# Patient Record
Sex: MALE | Race: WHITE | NOT HISPANIC OR LATINO | Employment: FULL TIME | URBAN - METROPOLITAN AREA
[De-identification: names, ages, dates, MRNs, and addresses within clinical notes are randomized per-mention and may not be internally consistent; named-entity substitution may affect disease eponyms.]

---

## 2017-01-09 RX ORDER — ALLOPURINOL 300 MG/1
TABLET ORAL
Qty: 90 TABLET | Refills: 0 | Status: SHIPPED | OUTPATIENT
Start: 2017-01-09 | End: 2017-04-24 | Stop reason: SDUPTHER

## 2017-01-16 RX ORDER — OLMESARTAN MEDOXOMIL 20 MG/1
TABLET, FILM COATED ORAL
Qty: 90 TABLET | Refills: 3 | Status: SHIPPED | OUTPATIENT
Start: 2017-01-16 | End: 2017-02-13 | Stop reason: DRUGHIGH

## 2017-02-13 ENCOUNTER — TELEPHONE (OUTPATIENT)
Dept: SPORTS MEDICINE | Facility: CLINIC | Age: 52
End: 2017-02-13

## 2017-02-13 DIAGNOSIS — I10 ESSENTIAL HYPERTENSION: Primary | ICD-10-CM

## 2017-02-13 RX ORDER — OLMESARTAN MEDOXOMIL 40 MG/1
40 TABLET ORAL DAILY
Qty: 30 TABLET | Refills: 3 | Status: SHIPPED | OUTPATIENT
Start: 2017-02-13 | End: 2017-04-24

## 2017-04-24 ENCOUNTER — OFFICE VISIT (OUTPATIENT)
Dept: SPORTS MEDICINE | Facility: CLINIC | Age: 52
End: 2017-04-24

## 2017-04-24 VITALS
BODY MASS INDEX: 31.08 KG/M2 | WEIGHT: 222 LBS | TEMPERATURE: 98 F | HEART RATE: 57 BPM | DIASTOLIC BLOOD PRESSURE: 96 MMHG | OXYGEN SATURATION: 98 % | HEIGHT: 71 IN | SYSTOLIC BLOOD PRESSURE: 140 MMHG

## 2017-04-24 DIAGNOSIS — I10 ESSENTIAL HYPERTENSION: ICD-10-CM

## 2017-04-24 DIAGNOSIS — R06.83 SNORING: ICD-10-CM

## 2017-04-24 DIAGNOSIS — Z11.59 NEED FOR HEPATITIS C SCREENING TEST: ICD-10-CM

## 2017-04-24 DIAGNOSIS — N50.819 TESTICULAR DISCOMFORT: ICD-10-CM

## 2017-04-24 DIAGNOSIS — K42.9 UMBILICAL HERNIA WITHOUT OBSTRUCTION AND WITHOUT GANGRENE: ICD-10-CM

## 2017-04-24 DIAGNOSIS — Z00.00 PREVENTATIVE HEALTH CARE: Primary | ICD-10-CM

## 2017-04-24 DIAGNOSIS — L82.1 SEBORRHEIC KERATOSIS: ICD-10-CM

## 2017-04-24 DIAGNOSIS — E79.0 HYPERURICEMIA: ICD-10-CM

## 2017-04-24 DIAGNOSIS — M72.2 PLANTAR FASCIITIS, LEFT: ICD-10-CM

## 2017-04-24 PROBLEM — M10.9 GOUT: Status: ACTIVE | Noted: 2017-04-24

## 2017-04-24 PROCEDURE — 90715 TDAP VACCINE 7 YRS/> IM: CPT | Performed by: FAMILY MEDICINE

## 2017-04-24 PROCEDURE — 99213 OFFICE O/P EST LOW 20 MIN: CPT | Performed by: FAMILY MEDICINE

## 2017-04-24 PROCEDURE — 90471 IMMUNIZATION ADMIN: CPT | Performed by: FAMILY MEDICINE

## 2017-04-24 PROCEDURE — 99396 PREV VISIT EST AGE 40-64: CPT | Performed by: FAMILY MEDICINE

## 2017-04-24 RX ORDER — BENAZEPRIL HYDROCHLORIDE 20 MG/1
20 TABLET ORAL DAILY
Qty: 90 TABLET | Refills: 3 | Status: SHIPPED | OUTPATIENT
Start: 2017-04-24 | End: 2017-07-07

## 2017-04-24 RX ORDER — ALLOPURINOL 300 MG/1
300 TABLET ORAL DAILY
Qty: 90 TABLET | Refills: 3 | Status: SHIPPED | OUTPATIENT
Start: 2017-04-24 | End: 2018-04-08 | Stop reason: SDUPTHER

## 2017-04-24 RX ORDER — OLMESARTAN MEDOXOMIL 40 MG/1
40 TABLET ORAL DAILY
Qty: 90 TABLET | Refills: 3 | Status: CANCELLED | OUTPATIENT
Start: 2017-04-24

## 2017-04-24 NOTE — PROGRESS NOTES
"Subjective   Evan Carballo is a 52 y.o. male.   Chief Complaint   Patient presents with   • Annual Exam       History of Present Illness   1.  CPE  2.  FU HTN, has been elevated over past few months even with increase in Benicar dose  3.  FU gout, no acute attacks in over a year  4.  R testicular pain past month, is intermittent, \"like I got hit in the nuts\". Brief in duration. No palpable areas of pain or \"lumps\" on SRINI.   5.  Check spot on back, wife says can get dry and flaky at times  6.  Hearing, [place], \"wife says I dont hear well\". Has had years of exposure to guns.   7.  Snoring and possible apnea per wife.   8.  Intermittent L heel pain, can be worse in AM, better after stretches.       I have reviewed the patient's medical history in detail and updated the computerized patient record.    Review of Systems   Constitutional: Negative.    HENT: Negative.    Eyes: Negative.    Respiratory: Negative.    Cardiovascular: Negative.    Gastrointestinal: Negative.    Genitourinary:        Per history of present illness   Musculoskeletal:        Per history of present illness   Skin:        Per history of present illness   Neurological: Negative.    Hematological: Negative.    Psychiatric/Behavioral: Negative.      /96  Pulse 57  Temp 98 °F (36.7 °C)  Ht 71\" (180.3 cm)  Wt 222 lb (101 kg)  SpO2 98%  BMI 30.96 kg/m2    Objective   Physical Exam   Constitutional: He is oriented to person, place, and time. He appears well-developed and well-nourished.   HENT:   Head: Normocephalic and atraumatic.   Right Ear: External ear normal.   Left Ear: External ear normal.   Nose: Nose normal.   Mouth/Throat: Oropharynx is clear and moist.   Eyes: Conjunctivae and EOM are normal. Pupils are equal, round, and reactive to light.   Neck: Normal range of motion. Neck supple. No thyromegaly present.   Cardiovascular: Normal rate, regular rhythm and normal heart sounds.    No peripheral edema   Pulmonary/Chest: Effort normal " and breath sounds normal.   Abdominal: Soft. Bowel sounds are normal.   Small reducible umbilical hernia to the left.   Genitourinary:   Genitourinary Comments: Bilateral testicular exam normal, there are no areas of tenderness over the epididymis.  No palpable masses.  There is no evidence of direct or indirect hernia.  No pain during exam.   Musculoskeletal:   Left foot and ankle normal in general appearance, motor 5 out of 5, neurovascularly intact.  Patient has tenderness at the plantar fascia origin on the medial calcaneus.  Negative Tinel's.   Neurological: He is alert and oriented to person, place, and time.   Skin: Skin is warm, dry and intact.   Area of concern on his back there is a 3 mm seborrheic keratosis   Psychiatric: He has a normal mood and affect. His behavior is normal. Thought content normal.   Vitals reviewed.      Assessment/Plan   Evan was seen today for annual exam.    Diagnoses and all orders for this visit:    Preventative health care  -     Ambulatory Referral For Screening Colonoscopy  -     Tdap Vaccine Greater Than or Equal To 6yo IM  -     CBC & Differential  -     Comprehensive Metabolic Panel  -     Lipid Panel With / Chol / HDL Ratio  -     PSA  -     T4, Free  -     TSH  -     UA / M With / Rflx Culture([facility] ONLY)    Essential hypertension    Hyperuricemia  -     Uric Acid    Need for hepatitis C screening test    Snoring  -     Ambulatory Referral to Sleep Medicine    Umbilical hernia without obstruction and without gangrene    Plantar fasciitis, left    Seborrheic keratosis    Testicular discomfort    Other orders  -     allopurinol (ZYLOPRIM) 300 MG tablet; Take 1 tablet by mouth Daily.  -     Cancel: olmesartan (BENICAR) 40 MG tablet; Take 1 tablet by mouth Daily.  -     benazepril (LOTENSIN) 20 MG tablet; Take 1 tablet by mouth Daily.      1.  Hypertension, will change medication to benazepril 20 mg daily, follow-up blood pressure 4 weeks with a BMP.  2.  Left plantar  fasciitis, continue heel cups and at home exercise program.  3.  Right testicular discomfort, is intermittent, normal exam, we'll recommend extra support undergarments but if there's no improvement over the next 4 weeks and refer to urologist.  4.  Snoring with possible witnessed apnea, senna for sleep study.  5.  Umbilical hernia, asymptomatic and reducible, we'll follow-up on one year but also gave the patient information of signs and symptoms when he needs to go to the emergency room.  He prefer to observe rather than to refer to general surgery at this time which I think is appropriate since he is asymptomatic and it is reducible.  6.  Decreased hearing, patient prefers to observe.  It is most likely secondary to repeated exposure to gun reports.  7.  Set up for colonoscopy.  8.  Seborrheic keratosis, observe.    EMR Dragon/Transcription disclaimer:    Much of this encounter note is an electronic transcription/translation of spoken language to printed text.  The electronic translation of spoken language may permit erroneous, or at times, nonsensical words or phrases to be inadvertently transcribed.  Although I have reviewed the note for such errors some may still exist.

## 2017-04-25 LAB
ALBUMIN SERPL-MCNC: 4.4 G/DL (ref 3.5–5.2)
ALBUMIN/GLOB SERPL: 1.6 G/DL
ALP SERPL-CCNC: 64 U/L (ref 39–117)
ALT SERPL-CCNC: 43 U/L (ref 1–41)
APPEARANCE UR: CLEAR
AST SERPL-CCNC: 26 U/L (ref 1–40)
BACTERIA #/AREA URNS HPF: NORMAL /HPF
BASOPHILS # BLD AUTO: 0.07 10*3/MM3 (ref 0–0.2)
BASOPHILS NFR BLD AUTO: 1 % (ref 0–1.5)
BILIRUB SERPL-MCNC: 0.5 MG/DL (ref 0.1–1.2)
BILIRUB UR QL STRIP: NEGATIVE
BUN SERPL-MCNC: 15 MG/DL (ref 6–20)
BUN/CREAT SERPL: 14.4 (ref 7–25)
CALCIUM SERPL-MCNC: 10 MG/DL (ref 8.6–10.5)
CHLORIDE SERPL-SCNC: 103 MMOL/L (ref 98–107)
CHOLEST SERPL-MCNC: 148 MG/DL (ref 0–200)
CHOLEST/HDLC SERPL: 3.89 {RATIO}
CO2 SERPL-SCNC: 26.4 MMOL/L (ref 22–29)
COLOR UR: YELLOW
CREAT SERPL-MCNC: 1.04 MG/DL (ref 0.76–1.27)
EOSINOPHIL # BLD AUTO: 0.13 10*3/MM3 (ref 0–0.7)
EOSINOPHIL NFR BLD AUTO: 1.8 % (ref 0.3–6.2)
EPI CELLS #/AREA URNS HPF: NORMAL /HPF
ERYTHROCYTE [DISTWIDTH] IN BLOOD BY AUTOMATED COUNT: 13.8 % (ref 11.5–14.5)
GLOBULIN SER CALC-MCNC: 2.8 GM/DL
GLUCOSE SERPL-MCNC: 83 MG/DL (ref 65–99)
GLUCOSE UR QL: NEGATIVE
HCT VFR BLD AUTO: 46.8 % (ref 40.4–52.2)
HDLC SERPL-MCNC: 38 MG/DL (ref 40–60)
HGB BLD-MCNC: 15.6 G/DL (ref 13.7–17.6)
HGB UR QL STRIP: NEGATIVE
IMM GRANULOCYTES # BLD: 0 10*3/MM3 (ref 0–0.03)
IMM GRANULOCYTES NFR BLD: 0 % (ref 0–0.5)
KETONES UR QL STRIP: NEGATIVE
LDLC SERPL CALC-MCNC: 80 MG/DL (ref 0–100)
LEUKOCYTE ESTERASE UR QL STRIP: NEGATIVE
LYMPHOCYTES # BLD AUTO: 1.61 10*3/MM3 (ref 0.9–4.8)
LYMPHOCYTES NFR BLD AUTO: 22.3 % (ref 19.6–45.3)
MCH RBC QN AUTO: 31.5 PG (ref 27–32.7)
MCHC RBC AUTO-ENTMCNC: 33.3 G/DL (ref 32.6–36.4)
MCV RBC AUTO: 94.4 FL (ref 79.8–96.2)
MICRO URNS: NORMAL
MICRO URNS: NORMAL
MONOCYTES # BLD AUTO: 0.95 10*3/MM3 (ref 0.2–1.2)
MONOCYTES NFR BLD AUTO: 13.1 % (ref 5–12)
NEUTROPHILS # BLD AUTO: 4.47 10*3/MM3 (ref 1.9–8.1)
NEUTROPHILS NFR BLD AUTO: 61.8 % (ref 42.7–76)
NITRITE UR QL STRIP: NEGATIVE
PH UR STRIP: 6 [PH] (ref 5–7.5)
PLATELET # BLD AUTO: 344 10*3/MM3 (ref 140–500)
POTASSIUM SERPL-SCNC: 5.1 MMOL/L (ref 3.5–5.2)
PROT SERPL-MCNC: 7.2 G/DL (ref 6–8.5)
PROT UR QL STRIP: NEGATIVE
PSA SERPL-MCNC: 0.67 NG/ML (ref 0–4)
RBC # BLD AUTO: 4.96 10*6/MM3 (ref 4.6–6)
RBC #/AREA URNS HPF: NORMAL /HPF
SODIUM SERPL-SCNC: 143 MMOL/L (ref 136–145)
SP GR UR: 1.02 (ref 1–1.03)
T4 FREE SERPL-MCNC: 1.14 NG/DL (ref 0.93–1.7)
TRIGL SERPL-MCNC: 148 MG/DL (ref 0–150)
TSH SERPL DL<=0.005 MIU/L-ACNC: 2.07 MIU/ML (ref 0.27–4.2)
URATE SERPL-MCNC: 5.5 MG/DL (ref 3.4–7)
URINALYSIS REFLEX: NORMAL
UROBILINOGEN UR STRIP-MCNC: 0.2 MG/DL (ref 0.2–1)
VLDLC SERPL CALC-MCNC: 29.6 MG/DL (ref 5–40)
WBC # BLD AUTO: 7.23 10*3/MM3 (ref 4.5–10.7)
WBC #/AREA URNS HPF: NORMAL /HPF

## 2017-06-20 ENCOUNTER — HOSPITAL ENCOUNTER (OUTPATIENT)
Dept: SLEEP MEDICINE | Facility: HOSPITAL | Age: 52
Discharge: HOME | End: 2017-06-20
Admitting: FAMILY MEDICINE

## 2017-06-20 DIAGNOSIS — R06.83 SNORING: ICD-10-CM

## 2017-06-20 DIAGNOSIS — G47.33 OBSTRUCTIVE SLEEP APNEA: Primary | ICD-10-CM

## 2017-06-20 PROCEDURE — G0463 HOSPITAL OUTPT CLINIC VISIT: HCPCS

## 2017-06-20 NOTE — PROGRESS NOTES
DATE OF CONSULTATION:  06/20/2017     REFERRING PHYSICIAN:  Reji Andrade MD    REASON FOR CONSULTATION:  Obstructive sleep apnea syndrome.     HISTORY OF PRESENT ILLNESS:  The patient is a 52-year-old male who has a history of hypertension, and complains of excessive daytime sleepiness and fatigue.     His wife has noted him to snore loud in all sleeping positions, and he is awakened with coughing, choking, respiratory discomfort, and dry mouth. No witnessed sleep apnea. At night he has difficulty staying asleep, with frequent awakenings and wakes up whenever he turns over.     His sleep schedule. Bedtime 10:30 p.m. to 7 a.m., sleep latency about 5-30 minutes, when he wakes up feels okay, and he sleeps for about 7 hours daily.     His [place] Sleepiness Scale score is 4 today.     PAST MEDICAL HISTORY:   1.  Hypertension.   2.  Gout.     CURRENT MEDICATIONS:  1.  Allopurinol.   2.  Benazepril.    ALLERGIES:  No medication allergies.     FAMILY HISTORY:  Positive for heart disease.     SOCIAL HISTORY:  He is in law enforcement. No tobacco or alcohol abuse. Caffeine intake minimal.     REVIEW OF SYSTEMS:  Ten system review significant for nasal congestion and fatigue.     PHYSICAL EXAMINATION:  VITAL SIGNS: Weight 226 pounds, height 71.5 inches, body mass index 31, neck collar size 16 inches, blood pressure 152/92, heart rate 56.   HEENT: Narrow oropharynx. Mallampati class III.  NECK: Supple. No bruits.   CARDIAC: Normal.   LUNGS: Clear to auscultation.   EXTREMITIES: No edema.     IMPRESSION:  Patient with hypertension, with chronic fatigue, loud snoring, awakens coughing, choking, and gasping for breath, and probably has obstructive sleep apnea syndrome.     I will proceed with polysomnography. Treat with CPAP therapy if needed. Follow up following the polysomnography.        STAN Enrique:ms  D:   06/20/2017 11:23:08  T:   06/20/2017 17:46:56  Job ID:   [id number]  Document ID:    [id number]  cc:

## 2017-07-07 ENCOUNTER — TELEPHONE (OUTPATIENT)
Dept: SPORTS MEDICINE | Facility: CLINIC | Age: 52
End: 2017-07-07

## 2017-07-07 RX ORDER — AMLODIPINE BESYLATE 5 MG/1
5 TABLET ORAL DAILY
Qty: 30 TABLET | Refills: 2 | Status: SHIPPED | OUTPATIENT
Start: 2017-07-07 | End: 2017-07-19 | Stop reason: ALTCHOICE

## 2017-07-19 ENCOUNTER — OFFICE VISIT (OUTPATIENT)
Dept: SPORTS MEDICINE | Facility: CLINIC | Age: 52
End: 2017-07-19

## 2017-07-19 VITALS
OXYGEN SATURATION: 98 % | SYSTOLIC BLOOD PRESSURE: 128 MMHG | WEIGHT: 223 LBS | DIASTOLIC BLOOD PRESSURE: 92 MMHG | HEIGHT: 71 IN | HEART RATE: 67 BPM | BODY MASS INDEX: 31.22 KG/M2

## 2017-07-19 DIAGNOSIS — I10 ESSENTIAL HYPERTENSION: Primary | ICD-10-CM

## 2017-07-19 PROCEDURE — 99213 OFFICE O/P EST LOW 20 MIN: CPT | Performed by: FAMILY MEDICINE

## 2017-07-19 RX ORDER — AMLODIPINE AND OLMESARTAN MEDOXOMIL 5; 20 MG/1; MG/1
1 TABLET ORAL DAILY
Qty: 30 TABLET | Refills: 11 | Status: SHIPPED | OUTPATIENT
Start: 2017-07-19 | End: 2017-08-09

## 2017-07-19 RX ORDER — AMLODIPINE AND OLMESARTAN MEDOXOMIL 5; 40 MG/1; MG/1
1 TABLET ORAL DAILY
Qty: 30 TABLET | Refills: 11 | Status: SHIPPED | OUTPATIENT
Start: 2017-07-19 | End: 2017-07-19 | Stop reason: ALTCHOICE

## 2017-07-19 NOTE — PROGRESS NOTES
"Subjective   Evan Carballo is a 52 y.o. male.   Chief Complaint   Patient presents with   • Hypertension       History of Present Illness   1.  Patient here to follow-up on hypertension.  He was originally on Benicar 20 mg a day which was controlling his blood pressure very well for a number of years however earlier this year his blood pressure was not well-controlled and we increased his Benicar to 40 mg a day.  Unfortunately that was not helpful as well and we therefore started benazepril 20 mg daily.  He then developed a dry cough and his medication was changed to amlodipine 5 mg daily.  His cough has resolved, he has no adverse effects from amlodipine every has noted occasional puffiness and feet and ankles at the end of the day.  No chest pain, shortness of breath, syncope or presyncope.          Review of Systems   Constitutional: Negative.    HENT: Negative.    Respiratory: Negative.    Cardiovascular: Positive for leg swelling. Negative for chest pain and palpitations.   Gastrointestinal: Negative.    Genitourinary: Negative.    Neurological: Negative.    /92  Pulse 67  Ht 71\" (180.3 cm)  Wt 223 lb (101 kg)  SpO2 98%  BMI 31.1 kg/m2      Objective   Physical Exam   Constitutional: He is oriented to person, place, and time. He appears well-developed and well-nourished.   HENT:   Head: Normocephalic and atraumatic.   Eyes: Conjunctivae are normal. No scleral icterus.   Neck: Neck supple.   Cardiovascular: Normal rate, regular rhythm and normal heart sounds.    Trace pedal edema   Pulmonary/Chest: Effort normal and breath sounds normal.   Lymphadenopathy:     He has no cervical adenopathy.   Neurological: He is alert and oriented to person, place, and time.   Skin: Skin is warm and dry.   Vitals reviewed.      Assessment/Plan   Evan was seen today for hypertension.    Diagnoses and all orders for this visit:    Essential hypertension  -     Discontinue: amlodipine-olmesartan (OTTO) 5-40 MG per " tablet; Take 1 tablet by mouth Daily.  -     amlodipine-olmesartan (OTTO) 5-20 MG per tablet; Take 1 tablet by mouth Daily.      Blood pressure not as well-controlled as I would like, will change medication to Otto 5-20 one by mouth daily and follow-up 3 weeks with BP check.    EMR Dragon/Transcription disclaimer:    Much of this encounter note is an electronic transcription/translation of spoken language to printed text.  The electronic translation of spoken language may permit erroneous, or at times, nonsensical words or phrases to be inadvertently transcribed.  Although I have reviewed the note for such errors some may still exist.

## 2017-08-09 ENCOUNTER — OFFICE VISIT (OUTPATIENT)
Dept: SPORTS MEDICINE | Facility: CLINIC | Age: 52
End: 2017-08-09

## 2017-08-09 VITALS
WEIGHT: 223 LBS | DIASTOLIC BLOOD PRESSURE: 72 MMHG | SYSTOLIC BLOOD PRESSURE: 118 MMHG | OXYGEN SATURATION: 98 % | HEIGHT: 71 IN | HEART RATE: 58 BPM | BODY MASS INDEX: 31.22 KG/M2

## 2017-08-09 DIAGNOSIS — I10 ESSENTIAL HYPERTENSION: Primary | ICD-10-CM

## 2017-08-09 DIAGNOSIS — L82.1 SEBORRHEIC KERATOSES: ICD-10-CM

## 2017-08-09 PROCEDURE — 99213 OFFICE O/P EST LOW 20 MIN: CPT | Performed by: FAMILY MEDICINE

## 2017-08-09 RX ORDER — AMLODIPINE AND OLMESARTAN MEDOXOMIL 5; 20 MG/1; MG/1
1 TABLET ORAL DAILY
COMMUNITY
End: 2018-09-20 | Stop reason: SDUPTHER

## 2017-08-09 RX ORDER — BENAZEPRIL HYDROCHLORIDE 20 MG/1
TABLET ORAL
COMMUNITY
Start: 2017-08-07 | End: 2017-08-09 | Stop reason: ALTCHOICE

## 2017-08-09 NOTE — PROGRESS NOTES
"Evan is a 52 y.o. year old male    No chief complaint on file.      History of Present Illness   HPI   1.  We d/c ACEI due to cough. Cough has resolved and no c/o with Otto.  2.  Check spot on  R leg. Noted over past few weeks. No bleeding or scabbing.       I have reviewed the patient's medical, family, and social history in detail and updated the computerized patient record.    Review of Systems   Constitutional: Negative.    HENT: Negative.    Respiratory: Negative.    Cardiovascular: Negative.    Skin:        Per HPI       /72  Pulse 58  Ht 71\" (180.3 cm)  Wt 223 lb (101 kg)  SpO2 98%  BMI 31.1 kg/m2     Physical Exam   Constitutional: He is oriented to person, place, and time. He appears well-developed and well-nourished.   HENT:   Head: Normocephalic and atraumatic.   Eyes: Conjunctivae and EOM are normal. Pupils are equal, round, and reactive to light.   Cardiovascular: Normal rate, regular rhythm and normal heart sounds.    No peripheral edema   Pulmonary/Chest: Effort normal.   Neurological: He is alert and oriented to person, place, and time.   Skin: Skin is warm and dry.   Tan, slightly scaly lesion R proximal post/lateral calf. Image has been saved to the media section of chart   Psychiatric: He has a normal mood and affect. His behavior is normal.   Vitals reviewed.       Diagnoses and all orders for this visit:    Essential hypertension    Seborrheic keratoses    Other orders  -     Discontinue: benazepril (LOTENSIN) 20 MG tablet;   -     amlodipine-olmesartan (OTTO) 5-20 MG per tablet; Take 1 tablet by mouth Daily.    FU at next CPE or sooner if needed  SK- observe.   "

## 2017-08-10 ENCOUNTER — HOSPITAL ENCOUNTER (OUTPATIENT)
Dept: SLEEP MEDICINE | Facility: HOSPITAL | Age: 52
Discharge: HOME | End: 2017-08-10
Attending: PSYCHIATRY & NEUROLOGY | Admitting: PSYCHIATRY & NEUROLOGY

## 2017-08-10 DIAGNOSIS — G47.33 OBSTRUCTIVE SLEEP APNEA: ICD-10-CM

## 2017-08-10 PROCEDURE — 95806 SLEEP STUDY UNATT&RESP EFFT: CPT

## 2017-08-17 ENCOUNTER — TELEPHONE (OUTPATIENT)
Dept: SLEEP MEDICINE | Facility: HOSPITAL | Age: 52
End: 2017-08-17

## 2017-08-29 ENCOUNTER — APPOINTMENT (OUTPATIENT)
Dept: SLEEP MEDICINE | Facility: HOSPITAL | Age: 52
End: 2017-08-29

## 2018-04-09 RX ORDER — ALLOPURINOL 300 MG/1
TABLET ORAL
Qty: 90 TABLET | Refills: 3 | Status: SHIPPED | OUTPATIENT
Start: 2018-04-09 | End: 2019-03-22 | Stop reason: SDUPTHER

## 2018-07-05 DIAGNOSIS — I10 ESSENTIAL HYPERTENSION: ICD-10-CM

## 2018-07-05 RX ORDER — AMLODIPINE AND OLMESARTAN MEDOXOMIL 5; 20 MG/1; MG/1
1 TABLET ORAL DAILY
Qty: 30 TABLET | Refills: 11 | Status: SHIPPED | OUTPATIENT
Start: 2018-07-05 | End: 2019-06-08 | Stop reason: SDUPTHER

## 2018-09-20 ENCOUNTER — OFFICE VISIT (OUTPATIENT)
Dept: SPORTS MEDICINE | Facility: CLINIC | Age: 53
End: 2018-09-20

## 2018-09-20 VITALS
WEIGHT: 223.2 LBS | HEIGHT: 71 IN | SYSTOLIC BLOOD PRESSURE: 122 MMHG | OXYGEN SATURATION: 97 % | BODY MASS INDEX: 31.25 KG/M2 | DIASTOLIC BLOOD PRESSURE: 88 MMHG | TEMPERATURE: 98.2 F | HEART RATE: 53 BPM

## 2018-09-20 DIAGNOSIS — Z12.11 SCREEN FOR COLON CANCER: ICD-10-CM

## 2018-09-20 DIAGNOSIS — Z00.00 PREVENTATIVE HEALTH CARE: Primary | ICD-10-CM

## 2018-09-20 DIAGNOSIS — L98.9 LEG SKIN LESION, RIGHT: ICD-10-CM

## 2018-09-20 DIAGNOSIS — I10 ESSENTIAL HYPERTENSION: ICD-10-CM

## 2018-09-20 DIAGNOSIS — E79.0 HYPERURICEMIA: ICD-10-CM

## 2018-09-20 PROCEDURE — 99396 PREV VISIT EST AGE 40-64: CPT | Performed by: FAMILY MEDICINE

## 2018-09-20 NOTE — PROGRESS NOTES
"Evan Carballo is here today for an annual physical exam.     - No c/o today    I have reviewed the patient's medical, family, and social history in detail and updated the computerized patient record.    Screening history:  Colonoscopy - due  Prostate - 2017  Metabolic - last year    Health Maintenance   Topic Date Due   • ZOSTER VACCINE (1 of 2) 02/22/2014   • HEPATITIS C SCREENING  04/24/2017   • COLONOSCOPY  04/24/2017   • ANNUAL PHYSICAL  04/25/2018   • INFLUENZA VACCINE  08/01/2018   • TDAP/TD VACCINES (2 - Td) 04/24/2027       Review of Systems   Constitutional: Negative for activity change, appetite change, chills, diaphoresis, fatigue, fever and unexpected weight change.   HENT: Negative for congestion, ear pain, postnasal drip, rhinorrhea, sinus pressure, sneezing, sore throat and trouble swallowing.    Eyes: Negative for visual disturbance.   Respiratory: Negative for cough, chest tightness, shortness of breath and wheezing.    Cardiovascular: Negative for chest pain and palpitations.   Gastrointestinal: Negative for abdominal pain, blood in stool, nausea and vomiting.   Endocrine: Negative for cold intolerance, polydipsia, polyphagia and polyuria.   Genitourinary: Negative for dysuria, flank pain, frequency, hematuria and urgency.   Musculoskeletal: Negative for arthralgias, back pain, joint swelling and myalgias.   Skin: Positive for rash.        Patient continues to have recurring issue of a very pruritic rash right anterior tibia.  He has tried numerous corticosteroid creams but \"it keeps coming back\".   Allergic/Immunologic: Negative for environmental allergies.   Neurological: Negative for dizziness, syncope, weakness, numbness and headaches.   Hematological: Negative for adenopathy. Does not bruise/bleed easily.   Psychiatric/Behavioral: Negative for agitation, decreased concentration, dysphoric mood, sleep disturbance and suicidal ideas. The patient is not nervous/anxious.        /88 (BP " "Location: Left arm, Patient Position: Sitting, Cuff Size: Large Adult)   Pulse 53   Temp 98.2 °F (36.8 °C) (Oral)   Ht 180.3 cm (70.98\")   Wt 101 kg (223 lb 3.2 oz)   SpO2 97%   BMI 31.14 kg/m²      Physical Exam    Vital signs reviewed.  General appearance: No acute distress  Eyes: conjunctiva clear without erythema; pupils equally round and reactive  ENT: external ears and nose normal; hearing normal, oropharynx clear  Neck: supple; no thyromegaly  CV: normal rate and rhythm; no peripheral edema  Respiratory: normal respiratory effort; lungs clear to auscultation bilaterally  MSK: normal gait and station; no focal joint deformity or swelling  Skin: Flat eczematous area over right anterior distal tibia rash or wounds; normal turgor  Neuro: cranial nerves 2-12 grossly intact; normal sensation to light touch  Psych: mood and affect normal; recent and remote memory intact    No visits with results within 2 Week(s) from this visit.   Latest known visit with results is:   Office Visit on 04/24/2017   Component Date Value Ref Range Status   • WBC 04/24/2017 7.23  4.50 - 10.70 10*3/mm3 Final   • RBC 04/24/2017 4.96  4.60 - 6.00 10*6/mm3 Final   • Hemoglobin 04/24/2017 15.6  13.7 - 17.6 g/dL Final   • Hematocrit 04/24/2017 46.8  40.4 - 52.2 % Final   • MCV 04/24/2017 94.4  79.8 - 96.2 fL Final   • MCH 04/24/2017 31.5  27.0 - 32.7 pg Final   • MCHC 04/24/2017 33.3  32.6 - 36.4 g/dL Final   • RDW 04/24/2017 13.8  11.5 - 14.5 % Final   • Platelets 04/24/2017 344  140 - 500 10*3/mm3 Final   • Neutrophil Rel % 04/24/2017 61.8  42.7 - 76.0 % Final   • Lymphocyte Rel % 04/24/2017 22.3  19.6 - 45.3 % Final   • Monocyte Rel % 04/24/2017 13.1* 5.0 - 12.0 % Final   • Eosinophil Rel % 04/24/2017 1.8  0.3 - 6.2 % Final   • Basophil Rel % 04/24/2017 1.0  0.0 - 1.5 % Final   • Neutrophils Absolute 04/24/2017 4.47  1.90 - 8.10 10*3/mm3 Final   • Lymphocytes Absolute 04/24/2017 1.61  0.90 - 4.80 10*3/mm3 Final   • Monocytes Absolute " 04/24/2017 0.95  0.20 - 1.20 10*3/mm3 Final   • Eosinophils Absolute 04/24/2017 0.13  0.00 - 0.70 10*3/mm3 Final   • Basophils Absolute 04/24/2017 0.07  0.00 - 0.20 10*3/mm3 Final   • Immature Granulocyte Rel % 04/24/2017 0.0  0.0 - 0.5 % Final   • Immature Grans Absolute 04/24/2017 0.00  0.00 - 0.03 10*3/mm3 Final   • Glucose 04/24/2017 83  65 - 99 mg/dL Final   • BUN 04/24/2017 15  6 - 20 mg/dL Final   • Creatinine 04/24/2017 1.04  0.76 - 1.27 mg/dL Final   • eGFR Non  Am 04/24/2017 75  >60 mL/min/1.73 Final   • eGFR African Am 04/24/2017 91  >60 mL/min/1.73 Final   • BUN/Creatinine Ratio 04/24/2017 14.4  7.0 - 25.0 Final   • Sodium 04/24/2017 143  136 - 145 mmol/L Final   • Potassium 04/24/2017 5.1  3.5 - 5.2 mmol/L Final   • Chloride 04/24/2017 103  98 - 107 mmol/L Final   • Total CO2 04/24/2017 26.4  22.0 - 29.0 mmol/L Final   • Calcium 04/24/2017 10.0  8.6 - 10.5 mg/dL Final   • Total Protein 04/24/2017 7.2  6.0 - 8.5 g/dL Final   • Albumin 04/24/2017 4.40  3.50 - 5.20 g/dL Final   • Globulin 04/24/2017 2.8  gm/dL Final   • A/G Ratio 04/24/2017 1.6  g/dL Final   • Total Bilirubin 04/24/2017 0.5  0.1 - 1.2 mg/dL Final   • Alkaline Phosphatase 04/24/2017 64  39 - 117 U/L Final   • AST (SGOT) 04/24/2017 26  1 - 40 U/L Final   • ALT (SGPT) 04/24/2017 43* 1 - 41 U/L Final   • Total Cholesterol 04/24/2017 148  0 - 200 mg/dL Final   • Triglycerides 04/24/2017 148  0 - 150 mg/dL Final   • HDL Cholesterol 04/24/2017 38* 40 - 60 mg/dL Final   • VLDL Cholesterol 04/24/2017 29.6  5 - 40 mg/dL Final   • LDL Cholesterol  04/24/2017 80  0 - 100 mg/dL Final   • Chol/HDL Ratio 04/24/2017 3.89   Final   • PSA 04/24/2017 0.671  0.000 - 4.000 ng/mL Final   • Free T4 04/24/2017 1.14  0.93 - 1.70 ng/dL Final   • TSH 04/24/2017 2.070  0.270 - 4.200 mIU/mL Final   • Specific Gravity, UA 04/24/2017 1.021  1.005 - 1.030 Final   • pH, UA 04/24/2017 6.0  5.0 - 7.5 Final   • Color, UA 04/24/2017 Yellow  Yellow Final   • Appearance,  UA 04/24/2017 Clear  Clear Final   • Leukocytes, UA 04/24/2017 Negative  Negative Final   • Protein 04/24/2017 Negative  Negative/Trace Final   • Glucose, UA 04/24/2017 Negative  Negative Final   • Ketones 04/24/2017 Negative  Negative Final   • Blood, UA 04/24/2017 Negative  Negative Final   • Bilirubin, UA 04/24/2017 Negative  Negative Final   • Urobilinogen, UA 04/24/2017 0.2  0.2 - 1.0 mg/dL Final   • Nitrite, UA 04/24/2017 Negative  Negative Final   • Microscopic Examination 04/24/2017 Comment   Final    Microscopic follows if indicated.   • MICROSCOPIC EXAMINATION 04/24/2017 See below:   Final    Microscopic was indicated and was performed.   • Urinalysis Reflex 04/24/2017 Comment   Final    This specimen will not reflex to a Urine Culture.   • Uric Acid 04/24/2017 5.5  3.4 - 7.0 mg/dL Final   • WBC, UA 04/24/2017 0-5  0 - 5 /hpf Final   • RBC, UA 04/24/2017 None seen  0 - 2 /hpf Final   • Epithelial Cells (non renal) 04/24/2017 None seen  0 - 10 /hpf Final   • Bacteria, UA 04/24/2017 Few  None seen/Few /hpf Final        Evan was seen today for annual exam.    Diagnoses and all orders for this visit:    Preventative health care  -     CBC & Differential  -     Comprehensive Metabolic Panel  -     Lipid Panel With / Chol / HDL Ratio  -     T4, Free  -     TSH  -     UA / M With / Rflx Culture([facility] ONLY) - Urine, Clean Catch  -     Uric Acid  -     PSA Screen    Essential hypertension  -     CBC & Differential  -     Comprehensive Metabolic Panel  -     Lipid Panel With / Chol / HDL Ratio  -     T4, Free  -     TSH  -     UA / M With / Rflx Culture([facility] ONLY) - Urine, Clean Catch  -     Uric Acid    Hyperuricemia  -     CBC & Differential  -     Comprehensive Metabolic Panel  -     Lipid Panel With / Chol / HDL Ratio  -     T4, Free  -     TSH  -     UA / M With / Rflx Culture([facility] ONLY) - Urine, Clean Catch  -     Uric Acid    Screen for colon cancer  -     Ambulatory Referral to General  Surgery    Leg skin lesion, right  -     Ambulatory Referral to Dermatology        Encourage healthy diet and exercise.  Encourage patient to stay up to date on screening examinations as indicated based on age and risk factors.    EMR Dragon/Transcription disclaimer:    Much of this encounter note is an electronic transcription/translation of spoken language to printed text.  The electronic translation of spoken language may permit erroneous, or at times, nonsensical words or phrases to be inadvertently transcribed.  Although I have reviewed the note for such errors some may still exist.

## 2018-09-21 LAB
ALBUMIN SERPL-MCNC: 4.7 G/DL (ref 3.5–5.2)
ALBUMIN/GLOB SERPL: 2 G/DL
ALP SERPL-CCNC: 64 U/L (ref 39–117)
ALT SERPL-CCNC: 48 U/L (ref 1–41)
APPEARANCE UR: CLEAR
AST SERPL-CCNC: 26 U/L (ref 1–40)
BACTERIA #/AREA URNS HPF: NORMAL /HPF
BASOPHILS # BLD AUTO: 0.07 10*3/MM3 (ref 0–0.2)
BASOPHILS NFR BLD AUTO: 1.1 % (ref 0–1.5)
BILIRUB SERPL-MCNC: 0.6 MG/DL (ref 0.1–1.2)
BILIRUB UR QL STRIP: NEGATIVE
BUN SERPL-MCNC: 20 MG/DL (ref 6–20)
BUN/CREAT SERPL: 17.4 (ref 7–25)
CALCIUM SERPL-MCNC: 9.9 MG/DL (ref 8.6–10.5)
CHLORIDE SERPL-SCNC: 105 MMOL/L (ref 98–107)
CHOLEST SERPL-MCNC: 138 MG/DL (ref 0–200)
CHOLEST/HDLC SERPL: 3.07 {RATIO}
CO2 SERPL-SCNC: 23.6 MMOL/L (ref 22–29)
COLOR UR: YELLOW
CREAT SERPL-MCNC: 1.15 MG/DL (ref 0.76–1.27)
EOSINOPHIL # BLD AUTO: 0.16 10*3/MM3 (ref 0–0.7)
EOSINOPHIL NFR BLD AUTO: 2.4 % (ref 0.3–6.2)
EPI CELLS #/AREA URNS HPF: NORMAL /HPF
ERYTHROCYTE [DISTWIDTH] IN BLOOD BY AUTOMATED COUNT: 13.8 % (ref 11.5–14.5)
GLOBULIN SER CALC-MCNC: 2.4 GM/DL
GLUCOSE SERPL-MCNC: 86 MG/DL (ref 65–99)
GLUCOSE UR QL: NEGATIVE
HCT VFR BLD AUTO: 46.9 % (ref 40.4–52.2)
HDLC SERPL-MCNC: 45 MG/DL (ref 40–60)
HGB BLD-MCNC: 15.2 G/DL (ref 13.7–17.6)
HGB UR QL STRIP: NEGATIVE
IMM GRANULOCYTES # BLD: 0 10*3/MM3 (ref 0–0.03)
IMM GRANULOCYTES NFR BLD: 0 % (ref 0–0.5)
KETONES UR QL STRIP: NEGATIVE
LDLC SERPL CALC-MCNC: 76 MG/DL (ref 0–100)
LEUKOCYTE ESTERASE UR QL STRIP: NEGATIVE
LYMPHOCYTES # BLD AUTO: 1.86 10*3/MM3 (ref 0.9–4.8)
LYMPHOCYTES NFR BLD AUTO: 28.1 % (ref 19.6–45.3)
MCH RBC QN AUTO: 30.8 PG (ref 27–32.7)
MCHC RBC AUTO-ENTMCNC: 32.4 G/DL (ref 32.6–36.4)
MCV RBC AUTO: 95.1 FL (ref 79.8–96.2)
MICRO URNS: NORMAL
MICRO URNS: NORMAL
MONOCYTES # BLD AUTO: 0.94 10*3/MM3 (ref 0.2–1.2)
MONOCYTES NFR BLD AUTO: 14.2 % (ref 5–12)
NEUTROPHILS # BLD AUTO: 3.6 10*3/MM3 (ref 1.9–8.1)
NEUTROPHILS NFR BLD AUTO: 54.2 % (ref 42.7–76)
NITRITE UR QL STRIP: NEGATIVE
PH UR STRIP: 6 [PH] (ref 5–7.5)
PLATELET # BLD AUTO: 316 10*3/MM3 (ref 140–500)
POTASSIUM SERPL-SCNC: 4.8 MMOL/L (ref 3.5–5.2)
PROT SERPL-MCNC: 7.1 G/DL (ref 6–8.5)
PROT UR QL STRIP: NEGATIVE
PSA SERPL-MCNC: 0.67 NG/ML (ref 0–4)
RBC # BLD AUTO: 4.93 10*6/MM3 (ref 4.6–6)
RBC #/AREA URNS HPF: NORMAL /HPF
SODIUM SERPL-SCNC: 142 MMOL/L (ref 136–145)
SP GR UR: 1.01 (ref 1–1.03)
T4 FREE SERPL-MCNC: 1.25 NG/DL (ref 0.93–1.7)
TRIGL SERPL-MCNC: 86 MG/DL (ref 0–150)
TSH SERPL DL<=0.005 MIU/L-ACNC: 2.58 MIU/ML (ref 0.27–4.2)
URATE SERPL-MCNC: 4.8 MG/DL (ref 3.4–7)
URINALYSIS REFLEX: NORMAL
UROBILINOGEN UR STRIP-MCNC: 0.2 MG/DL (ref 0.2–1)
VLDLC SERPL CALC-MCNC: 17.2 MG/DL (ref 5–40)
WBC # BLD AUTO: 6.63 10*3/MM3 (ref 4.5–10.7)
WBC #/AREA URNS HPF: NORMAL /HPF

## 2019-03-22 RX ORDER — ALLOPURINOL 300 MG/1
TABLET ORAL
Qty: 90 TABLET | Refills: 3 | Status: SHIPPED | OUTPATIENT
Start: 2019-03-22 | End: 2020-03-10

## 2019-06-06 ENCOUNTER — TELEPHONE (OUTPATIENT)
Dept: SPORTS MEDICINE | Facility: CLINIC | Age: 54
End: 2019-06-06

## 2019-06-07 DIAGNOSIS — E79.0 HYPERURICEMIA: Primary | ICD-10-CM

## 2019-06-07 RX ORDER — COLCHICINE 0.6 MG/1
TABLET ORAL
Qty: 30 TABLET | Refills: 6 | Status: SHIPPED | OUTPATIENT
Start: 2019-06-07

## 2019-06-08 DIAGNOSIS — I10 ESSENTIAL HYPERTENSION: ICD-10-CM

## 2019-06-10 RX ORDER — AMLODIPINE AND OLMESARTAN MEDOXOMIL 5; 20 MG/1; MG/1
1 TABLET ORAL DAILY
Qty: 30 TABLET | Refills: 10 | Status: SHIPPED | OUTPATIENT
Start: 2019-06-10 | End: 2020-03-11

## 2019-07-19 ENCOUNTER — OUTSIDE FACILITY SERVICE (OUTPATIENT)
Dept: SURGERY | Facility: CLINIC | Age: 54
End: 2019-07-19

## 2019-07-19 PROCEDURE — 45378 DIAGNOSTIC COLONOSCOPY: CPT | Performed by: SURGERY

## 2020-03-10 RX ORDER — ALLOPURINOL 300 MG/1
TABLET ORAL
Qty: 90 TABLET | Refills: 3 | Status: SHIPPED | OUTPATIENT
Start: 2020-03-10 | End: 2021-03-08

## 2020-03-11 DIAGNOSIS — I10 ESSENTIAL HYPERTENSION: ICD-10-CM

## 2020-03-11 RX ORDER — AMLODIPINE AND OLMESARTAN MEDOXOMIL 5; 20 MG/1; MG/1
1 TABLET ORAL DAILY
Qty: 90 TABLET | Refills: 3 | Status: SHIPPED | OUTPATIENT
Start: 2020-03-11 | End: 2021-03-08

## 2020-03-16 ENCOUNTER — OFFICE VISIT (OUTPATIENT)
Dept: SPORTS MEDICINE | Facility: CLINIC | Age: 55
End: 2020-03-16

## 2020-03-16 VITALS
BODY MASS INDEX: 31.92 KG/M2 | SYSTOLIC BLOOD PRESSURE: 136 MMHG | HEIGHT: 71 IN | DIASTOLIC BLOOD PRESSURE: 76 MMHG | WEIGHT: 228 LBS | OXYGEN SATURATION: 98 % | TEMPERATURE: 98.3 F | HEART RATE: 52 BPM

## 2020-03-16 DIAGNOSIS — Z00.00 PREVENTATIVE HEALTH CARE: Primary | ICD-10-CM

## 2020-03-16 DIAGNOSIS — E79.0 HYPERURICEMIA: ICD-10-CM

## 2020-03-16 DIAGNOSIS — I10 ESSENTIAL HYPERTENSION: ICD-10-CM

## 2020-03-16 DIAGNOSIS — Z11.59 NEED FOR HEPATITIS C SCREENING TEST: ICD-10-CM

## 2020-03-16 PROCEDURE — 99396 PREV VISIT EST AGE 40-64: CPT | Performed by: FAMILY MEDICINE

## 2020-03-16 NOTE — PROGRESS NOTES
Evan Carballo is here today for an annual physical exam.     Eating a healthy diet. Exercising routinely.     PHQ-2 Depression Screening  Little interest or pleasure in doing things? 0   Feeling down, depressed, or hopeless? 0   PHQ-2 Total Score 0        I have reviewed the patient's medical, family, and social history in detail and updated the computerized patient record.    Screening history:  Colonoscopy - due 2029  Prostate - 2019  Metabolic - last year    Health Maintenance   Topic Date Due   • ZOSTER VACCINE (1 of 2) 02/22/2014   • HEPATITIS C SCREENING  04/24/2017   • ANNUAL PHYSICAL  09/21/2019   • TDAP/TD VACCINES (2 - Td) 04/24/2027   • COLONOSCOPY  07/19/2029   • INFLUENZA VACCINE  Addressed       Review of Systems   Constitutional: Negative for activity change, appetite change, chills, diaphoresis, fatigue, fever and unexpected weight change.   HENT: Negative for congestion, ear pain, postnasal drip, rhinorrhea, sinus pressure, sneezing, sore throat and trouble swallowing.    Eyes: Negative for visual disturbance.   Respiratory: Negative for cough, chest tightness, shortness of breath and wheezing.    Cardiovascular: Negative for chest pain and palpitations.   Gastrointestinal: Negative for abdominal pain, blood in stool, nausea and vomiting.   Endocrine: Negative for cold intolerance, polydipsia, polyphagia and polyuria.   Genitourinary: Negative for dysuria, flank pain, frequency, hematuria and urgency.   Musculoskeletal: Negative for arthralgias, back pain, joint swelling and myalgias.   Skin: Negative for rash.   Allergic/Immunologic: Negative for environmental allergies.   Neurological: Negative for dizziness, syncope, weakness, numbness and headaches.   Hematological: Negative for adenopathy. Does not bruise/bleed easily.   Psychiatric/Behavioral: Negative for agitation, decreased concentration, dysphoric mood, sleep disturbance and suicidal ideas. The patient is not nervous/anxious.        BP  "136/76 (BP Location: Left arm, Patient Position: Sitting, Cuff Size: Adult)   Pulse 52   Temp 98.3 °F (36.8 °C) (Oral)   Ht 180.3 cm (70.98\")   Wt 103 kg (228 lb)   SpO2 98%   BMI 31.81 kg/m²      Physical Exam    Vital signs reviewed.  General appearance: No acute distress  Eyes: conjunctiva clear without erythema; pupils equally round and reactive  ENT: external ears and nose normal; hearing normal, oropharynx clear  Neck: supple; no thyromegaly  CV: normal rate and rhythm; no peripheral edema  Respiratory: normal respiratory effort; lungs clear to auscultation bilaterally  MSK: normal gait and station; no focal joint deformity or swelling  Skin: no rash or wounds; normal turgor  Neuro: cranial nerves 2-12 grossly intact; normal sensation to light touch  Psych: mood and affect normal; recent and remote memory intact    No visits with results within 2 Week(s) from this visit.   Latest known visit with results is:   Office Visit on 09/20/2018   Component Date Value Ref Range Status   • WBC 09/20/2018 6.63  4.50 - 10.70 10*3/mm3 Final   • RBC 09/20/2018 4.93  4.60 - 6.00 10*6/mm3 Final   • Hemoglobin 09/20/2018 15.2  13.7 - 17.6 g/dL Final   • Hematocrit 09/20/2018 46.9  40.4 - 52.2 % Final   • MCV 09/20/2018 95.1  79.8 - 96.2 fL Final   • MCH 09/20/2018 30.8  27.0 - 32.7 pg Final   • MCHC 09/20/2018 32.4* 32.6 - 36.4 g/dL Final   • RDW 09/20/2018 13.8  11.5 - 14.5 % Final   • Platelets 09/20/2018 316  140 - 500 10*3/mm3 Final   • Neutrophil Rel % 09/20/2018 54.2  42.7 - 76.0 % Final   • Lymphocyte Rel % 09/20/2018 28.1  19.6 - 45.3 % Final   • Monocyte Rel % 09/20/2018 14.2* 5.0 - 12.0 % Final   • Eosinophil Rel % 09/20/2018 2.4  0.3 - 6.2 % Final   • Basophil Rel % 09/20/2018 1.1  0.0 - 1.5 % Final   • Neutrophils Absolute 09/20/2018 3.60  1.90 - 8.10 10*3/mm3 Final   • Lymphocytes Absolute 09/20/2018 1.86  0.90 - 4.80 10*3/mm3 Final   • Monocytes Absolute 09/20/2018 0.94  0.20 - 1.20 10*3/mm3 Final   • " Eosinophils Absolute 09/20/2018 0.16  0.00 - 0.70 10*3/mm3 Final   • Basophils Absolute 09/20/2018 0.07  0.00 - 0.20 10*3/mm3 Final   • Immature Granulocyte Rel % 09/20/2018 0.0  0.0 - 0.5 % Final   • Immature Grans Absolute 09/20/2018 0.00  0.00 - 0.03 10*3/mm3 Final   • Glucose 09/20/2018 86  65 - 99 mg/dL Final   • BUN 09/20/2018 20  6 - 20 mg/dL Final   • Creatinine 09/20/2018 1.15  0.76 - 1.27 mg/dL Final   • eGFR Non  Am 09/20/2018 66  >60 mL/min/1.73 Final   • eGFR African Am 09/20/2018 80  >60 mL/min/1.73 Final   • BUN/Creatinine Ratio 09/20/2018 17.4  7.0 - 25.0 Final   • Sodium 09/20/2018 142  136 - 145 mmol/L Final   • Potassium 09/20/2018 4.8  3.5 - 5.2 mmol/L Final   • Chloride 09/20/2018 105  98 - 107 mmol/L Final   • Total CO2 09/20/2018 23.6  22.0 - 29.0 mmol/L Final   • Calcium 09/20/2018 9.9  8.6 - 10.5 mg/dL Final   • Total Protein 09/20/2018 7.1  6.0 - 8.5 g/dL Final   • Albumin 09/20/2018 4.70  3.50 - 5.20 g/dL Final   • Globulin 09/20/2018 2.4  gm/dL Final   • A/G Ratio 09/20/2018 2.0  g/dL Final   • Total Bilirubin 09/20/2018 0.6  0.1 - 1.2 mg/dL Final   • Alkaline Phosphatase 09/20/2018 64  39 - 117 U/L Final   • AST (SGOT) 09/20/2018 26  1 - 40 U/L Final   • ALT (SGPT) 09/20/2018 48* 1 - 41 U/L Final   • Total Cholesterol 09/20/2018 138  0 - 200 mg/dL Final   • Triglycerides 09/20/2018 86  0 - 150 mg/dL Final   • HDL Cholesterol 09/20/2018 45  40 - 60 mg/dL Final   • VLDL Cholesterol 09/20/2018 17.2  5 - 40 mg/dL Final   • LDL Cholesterol  09/20/2018 76  0 - 100 mg/dL Final   • Chol/HDL Ratio 09/20/2018 3.07   Final   • Free T4 09/20/2018 1.25  0.93 - 1.70 ng/dL Final   • TSH 09/20/2018 2.580  0.270 - 4.200 mIU/mL Final   • Specific Gravity, UA 09/20/2018 1.013  1.005 - 1.030 Final   • pH, UA 09/20/2018 6.0  5.0 - 7.5 Final   • Color, UA 09/20/2018 Yellow  Yellow Final   • Appearance, UA 09/20/2018 Clear  Clear Final   • Leukocytes, UA 09/20/2018 Negative  Negative Final   • Protein  09/20/2018 Negative  Negative/Trace Final   • Glucose, UA 09/20/2018 Negative  Negative Final   • Ketones 09/20/2018 Negative  Negative Final   • Blood, UA 09/20/2018 Negative  Negative Final   • Bilirubin, UA 09/20/2018 Negative  Negative Final   • Urobilinogen, UA 09/20/2018 0.2  0.2 - 1.0 mg/dL Final   • Nitrite, UA 09/20/2018 Negative  Negative Final   • Microscopic Examination 09/20/2018 Comment   Final    Microscopic follows if indicated.   • Microscopic Examination 09/20/2018 See below:   Final    Microscopic was indicated and was performed.   • Urinalysis Reflex 09/20/2018 Comment   Final    This specimen will not reflex to a Urine Culture.   • Uric Acid 09/20/2018 4.8  3.4 - 7.0 mg/dL Final   • PSA 09/20/2018 0.674  0.000 - 4.000 ng/mL Final   • WBC, UA 09/20/2018 None seen  0 - 5 /hpf Final   • RBC, UA 09/20/2018 None seen  0 - 2 /hpf Final   • Epithelial Cells (non renal) 09/20/2018 0-10  0 - 10 /hpf Final   • Bacteria, UA 09/20/2018 None seen  None seen/Few /hpf Final          Current Outpatient Medications:   •  allopurinol (ZYLOPRIM) 300 MG tablet, TAKE 1 TABLET BY MOUTH EVERY DAY, Disp: 90 tablet, Rfl: 3  •  amlodipine-olmesartan (OTTO) 5-20 MG per tablet, TAKE 1 TABLET BY MOUTH DAILY., Disp: 90 tablet, Rfl: 3  •  colchicine 0.6 MG tablet, 2 at onset of gout symptoms, 1 tablet 1 hour later then 1 tablet daily for 5 days., Disp: 30 tablet, Rfl: 6    Evan was seen today for annual exam.    Diagnoses and all orders for this visit:    Preventative health care  -     Hemoglobin A1c  -     CBC & Differential  -     Comprehensive Metabolic Panel  -     Lipid Panel With / Chol / HDL Ratio  -     TSH  -     T4, Free  -     Uric Acid  -     PSA Screen  -     Hepatitis C Antibody    Essential hypertension  -     Hemoglobin A1c  -     CBC & Differential  -     Comprehensive Metabolic Panel  -     Lipid Panel With / Chol / HDL Ratio  -     TSH  -     T4, Free  -     Uric Acid  -     PSA Screen  -     Hepatitis C  Antibody    Hyperuricemia  -     Hemoglobin A1c  -     CBC & Differential  -     Comprehensive Metabolic Panel  -     Lipid Panel With / Chol / HDL Ratio  -     TSH  -     T4, Free  -     Uric Acid  -     PSA Screen  -     Hepatitis C Antibody    Need for hepatitis C screening test  -     Hemoglobin A1c  -     CBC & Differential  -     Comprehensive Metabolic Panel  -     Lipid Panel With / Chol / HDL Ratio  -     TSH  -     T4, Free  -     Uric Acid  -     PSA Screen  -     Hepatitis C Antibody      Shingrix d/w patient    Encourage healthy diet and exercise.  Encourage patient to stay up to date on screening examinations as indicated based on age and risk factors.    EMR Dragon/Transcription disclaimer:    Much of this encounter note is an electronic transcription/translation of spoken language to printed text.  The electronic translation of spoken language may permit erroneous, or at times, nonsensical words or phrases to be inadvertently transcribed.  Although I have reviewed the note for such errors some may still exist.

## 2020-03-17 LAB
ALBUMIN SERPL-MCNC: 4.6 G/DL (ref 3.5–5.2)
ALBUMIN/GLOB SERPL: 2.3 G/DL
ALP SERPL-CCNC: 73 U/L (ref 39–117)
ALT SERPL-CCNC: 39 U/L (ref 1–41)
AST SERPL-CCNC: 22 U/L (ref 1–40)
BASOPHILS # BLD AUTO: 0.1 10*3/MM3 (ref 0–0.2)
BASOPHILS NFR BLD AUTO: 1.7 % (ref 0–1.5)
BILIRUB SERPL-MCNC: 0.6 MG/DL (ref 0.2–1.2)
BUN SERPL-MCNC: 15 MG/DL (ref 6–20)
BUN/CREAT SERPL: 14.2 (ref 7–25)
CALCIUM SERPL-MCNC: 9.5 MG/DL (ref 8.6–10.5)
CHLORIDE SERPL-SCNC: 104 MMOL/L (ref 98–107)
CHOLEST SERPL-MCNC: 142 MG/DL (ref 0–200)
CHOLEST/HDLC SERPL: 3.46 {RATIO}
CO2 SERPL-SCNC: 26.8 MMOL/L (ref 22–29)
CREAT SERPL-MCNC: 1.06 MG/DL (ref 0.76–1.27)
EOSINOPHIL # BLD AUTO: 0.17 10*3/MM3 (ref 0–0.4)
EOSINOPHIL NFR BLD AUTO: 2.9 % (ref 0.3–6.2)
ERYTHROCYTE [DISTWIDTH] IN BLOOD BY AUTOMATED COUNT: 13.1 % (ref 12.3–15.4)
GLOBULIN SER CALC-MCNC: 2 GM/DL
GLUCOSE SERPL-MCNC: 92 MG/DL (ref 65–99)
HBA1C MFR BLD: 5.3 % (ref 4.8–5.6)
HCT VFR BLD AUTO: 44.6 % (ref 37.5–51)
HCV AB S/CO SERPL IA: <0.1 S/CO RATIO (ref 0–0.9)
HDLC SERPL-MCNC: 41 MG/DL (ref 40–60)
HGB BLD-MCNC: 15.6 G/DL (ref 13–17.7)
IMM GRANULOCYTES # BLD AUTO: 0.02 10*3/MM3 (ref 0–0.05)
IMM GRANULOCYTES NFR BLD AUTO: 0.3 % (ref 0–0.5)
LDLC SERPL CALC-MCNC: 80 MG/DL (ref 0–100)
LYMPHOCYTES # BLD AUTO: 1.52 10*3/MM3 (ref 0.7–3.1)
LYMPHOCYTES NFR BLD AUTO: 26 % (ref 19.6–45.3)
MCH RBC QN AUTO: 31 PG (ref 26.6–33)
MCHC RBC AUTO-ENTMCNC: 35 G/DL (ref 31.5–35.7)
MCV RBC AUTO: 88.5 FL (ref 79–97)
MONOCYTES # BLD AUTO: 0.82 10*3/MM3 (ref 0.1–0.9)
MONOCYTES NFR BLD AUTO: 14 % (ref 5–12)
NEUTROPHILS # BLD AUTO: 3.21 10*3/MM3 (ref 1.7–7)
NEUTROPHILS NFR BLD AUTO: 55.1 % (ref 42.7–76)
NRBC BLD AUTO-RTO: 0 /100 WBC (ref 0–0.2)
PLATELET # BLD AUTO: 316 10*3/MM3 (ref 140–450)
POTASSIUM SERPL-SCNC: 5.2 MMOL/L (ref 3.5–5.2)
PROT SERPL-MCNC: 6.6 G/DL (ref 6–8.5)
PSA SERPL-MCNC: 0.54 NG/ML (ref 0–4)
RBC # BLD AUTO: 5.04 10*6/MM3 (ref 4.14–5.8)
SODIUM SERPL-SCNC: 142 MMOL/L (ref 136–145)
T4 FREE SERPL-MCNC: 1 NG/DL (ref 0.93–1.7)
TRIGL SERPL-MCNC: 107 MG/DL (ref 0–150)
TSH SERPL DL<=0.005 MIU/L-ACNC: 3.57 UIU/ML (ref 0.27–4.2)
URATE SERPL-MCNC: 4.9 MG/DL (ref 3.4–7)
VLDLC SERPL CALC-MCNC: 21.4 MG/DL
WBC # BLD AUTO: 5.84 10*3/MM3 (ref 3.4–10.8)

## 2020-03-23 ENCOUNTER — TELEPHONE (OUTPATIENT)
Dept: SPORTS MEDICINE | Facility: CLINIC | Age: 55
End: 2020-03-23

## 2020-03-23 NOTE — TELEPHONE ENCOUNTER
Relayed labs to patient       ----- Message from Reji Andrade MD sent at 3/17/2020  8:57 AM EDT -----  Labs look great

## 2020-07-17 ENCOUNTER — TELEPHONE (OUTPATIENT)
Dept: SPORTS MEDICINE | Facility: CLINIC | Age: 55
End: 2020-07-17

## 2020-07-31 ENCOUNTER — OFFICE VISIT (OUTPATIENT)
Dept: SPORTS MEDICINE | Facility: CLINIC | Age: 55
End: 2020-07-31

## 2020-07-31 VITALS — HEIGHT: 71 IN | BODY MASS INDEX: 31.92 KG/M2 | WEIGHT: 228 LBS

## 2020-07-31 DIAGNOSIS — L98.9 SKIN LESION: Primary | ICD-10-CM

## 2020-07-31 PROCEDURE — 99441 PR PHYS/QHP TELEPHONE EVALUATION 5-10 MIN: CPT | Performed by: FAMILY MEDICINE

## 2020-07-31 NOTE — PROGRESS NOTES
You have chosen to receive care through a telephone visit. Do you consent to use a telephone visit for your medical care today? Yes      Telephone Visit Note    CC: Skin lesion    History of Present Illness  Patient with a history of venereal warts many years ago, was treated with Condylox.  Thinks he may have a another one that has popped up over the past couple of days.  Like to have refill.  Patient's wife is aware.  I will send in his medication but he will let me know if there is no significant improvement over the next week.      Diagnoses and all orders for this visit:    Skin lesion  -     podofilox (CONDYLOX) 0.5 % gel; Apply  topically to the appropriate area as directed 2 (Two) Times a Day.          This patient was evaluated by telephone due to current precautions with COVID-19.  Consent to telephone visit for this problem was given by the patient. I spent a total of 4 minutes on the phone with the patient, but spent total of 5 minutes spent in chart, reviewing old records.

## 2020-08-03 ENCOUNTER — TELEPHONE (OUTPATIENT)
Dept: SPORTS MEDICINE | Facility: CLINIC | Age: 55
End: 2020-08-03

## 2020-08-03 RX ORDER — IMIQUIMOD 12.5 MG/.25G
CREAM TOPICAL 3 TIMES WEEKLY
Qty: 24 EACH | Refills: 5 | Status: SHIPPED | OUTPATIENT
Start: 2020-08-03

## 2020-08-03 NOTE — TELEPHONE ENCOUNTER
Patient called in, stated the RX we sent in for him for the gel is over $300 with his insurance. Wants to know if there is an alternative medication we gave give that wouldn't be so expensive?.    Thanks  Andra

## 2021-01-05 ENCOUNTER — TELEPHONE (OUTPATIENT)
Dept: SPORTS MEDICINE | Facility: CLINIC | Age: 56
End: 2021-01-05

## 2021-01-05 DIAGNOSIS — G47.9 SLEEP DISTURBANCE: Primary | ICD-10-CM

## 2021-01-05 RX ORDER — TRAZODONE HYDROCHLORIDE 100 MG/1
TABLET ORAL
Qty: 30 TABLET | Refills: 3 | Status: SHIPPED | OUTPATIENT
Start: 2021-01-05 | End: 2022-04-13 | Stop reason: ALTCHOICE

## 2021-01-05 NOTE — TELEPHONE ENCOUNTER
Patient called and states that he is having some personal issues going on that is causing him to have a lack of sleep and cannot hardly eat anything. He would like to get medication sent in for this. He doesn't know if it is anxiety related but it has been bad the past 3 days. Please advise, thank you.

## 2021-01-07 ENCOUNTER — TELEPHONE (OUTPATIENT)
Dept: ORTHOPEDICS | Facility: OTHER | Age: 56
End: 2021-01-07

## 2021-01-07 NOTE — TELEPHONE ENCOUNTER
Caller: MR TOUSSAINT    Relationship: PT/SELF     Best call back number: [phone]    Additional notes: PT CALLED IN REGARDING THE PRESCRIPTION OF TRAZADONE, PT STATES THAT HE WAS ASKING FOR SOMETHING TO HELP HIM RELAX AND SLEEP AT NIGHT PT SAID HE GOT THE TRAZADONE FILLED BUT IS NOT AND HASNT TAKEN IT DUE TO THIS MEDICATION BEING A ANTI-DEPRESSANT AND PT SAID THAT WAS NOT WHAT HE WAS LOOKING FOR. PT SAID HE IS DOING BETTER NOW AND NOT REQ NO NEW MEDS JUST WANTED TO MAKE  AWARE.

## 2021-03-07 DIAGNOSIS — I10 ESSENTIAL HYPERTENSION: ICD-10-CM

## 2021-03-08 RX ORDER — ALLOPURINOL 300 MG/1
TABLET ORAL
Qty: 90 TABLET | Refills: 3 | Status: SHIPPED | OUTPATIENT
Start: 2021-03-08 | End: 2022-02-16 | Stop reason: SDUPTHER

## 2021-03-08 RX ORDER — AMLODIPINE AND OLMESARTAN MEDOXOMIL 5; 20 MG/1; MG/1
TABLET ORAL
Qty: 90 TABLET | Refills: 3 | Status: SHIPPED | OUTPATIENT
Start: 2021-03-08 | End: 2022-02-16 | Stop reason: SDUPTHER

## 2021-09-27 ENCOUNTER — TELEPHONE (OUTPATIENT)
Dept: SPORTS MEDICINE | Facility: CLINIC | Age: 56
End: 2021-09-27
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
## 2021-10-26 ENCOUNTER — OFFICE VISIT (OUTPATIENT)
Dept: SPORTS MEDICINE | Facility: CLINIC | Age: 56
End: 2021-10-26

## 2021-10-26 VITALS
RESPIRATION RATE: 16 BRPM | TEMPERATURE: 97 F | WEIGHT: 215 LBS | HEART RATE: 74 BPM | SYSTOLIC BLOOD PRESSURE: 142 MMHG | BODY MASS INDEX: 30.1 KG/M2 | OXYGEN SATURATION: 98 % | DIASTOLIC BLOOD PRESSURE: 88 MMHG | HEIGHT: 71 IN

## 2021-10-26 DIAGNOSIS — M54.81 BILATERAL OCCIPITAL NEURALGIA: Primary | ICD-10-CM

## 2021-10-26 DIAGNOSIS — M54.12 CERVICAL RADICULITIS: ICD-10-CM

## 2021-10-26 DIAGNOSIS — S16.1XXA STRAIN OF NECK MUSCLE, INITIAL ENCOUNTER: ICD-10-CM

## 2021-10-26 PROCEDURE — 99214 OFFICE O/P EST MOD 30 MIN: CPT | Performed by: FAMILY MEDICINE

## 2021-10-26 RX ORDER — PREDNISONE 10 MG/1
TABLET ORAL
Qty: 48 TABLET | Refills: 0 | Status: SHIPPED | OUTPATIENT
Start: 2021-10-26 | End: 2022-02-16

## 2021-10-26 RX ORDER — CYCLOBENZAPRINE HCL 10 MG
TABLET ORAL
COMMUNITY
Start: 2021-10-05 | End: 2022-02-16

## 2021-10-26 RX ORDER — DICLOFENAC SODIUM 75 MG/1
TABLET, DELAYED RELEASE ORAL
COMMUNITY
Start: 2021-10-05 | End: 2021-10-26

## 2021-10-26 RX ORDER — PREDNISONE 10 MG/1
TABLET ORAL
Qty: 48 TABLET | Refills: 0 | Status: SHIPPED | OUTPATIENT
Start: 2021-10-26 | End: 2021-10-26 | Stop reason: SDUPTHER
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
## 2021-11-09 ENCOUNTER — OFFICE VISIT (OUTPATIENT)
Dept: SPORTS MEDICINE | Facility: CLINIC | Age: 56
End: 2021-11-09

## 2021-11-09 ENCOUNTER — TELEPHONE (OUTPATIENT)
Dept: SPORTS MEDICINE | Facility: CLINIC | Age: 56
End: 2021-11-09

## 2021-11-09 VITALS
SYSTOLIC BLOOD PRESSURE: 134 MMHG | TEMPERATURE: 97.8 F | DIASTOLIC BLOOD PRESSURE: 72 MMHG | HEIGHT: 71 IN | HEART RATE: 68 BPM | BODY MASS INDEX: 30.1 KG/M2 | WEIGHT: 215 LBS | OXYGEN SATURATION: 98 %

## 2021-11-09 DIAGNOSIS — M54.12 CERVICAL RADICULITIS: ICD-10-CM

## 2021-11-09 DIAGNOSIS — M54.81 BILATERAL OCCIPITAL NEURALGIA: Primary | ICD-10-CM

## 2021-11-09 PROCEDURE — 99213 OFFICE O/P EST LOW 20 MIN: CPT | Performed by: FAMILY MEDICINE
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
## 2021-11-29 ENCOUNTER — TELEPHONE (OUTPATIENT)
Dept: SPORTS MEDICINE | Facility: CLINIC | Age: 56
End: 2021-11-29

## 2021-11-29 NOTE — TELEPHONE ENCOUNTER
I reviewed his MRI the radiologist read it as probable meningioma. These are benign but I would still recommend seeing the neurosurgeon that he has been scheduled   
Patient came in the office in regards to the MRI he had on 11/18/21 of his brain. He brought in a copy that I have placed on your desk.   Would you like for him to come in for a follow up or would you like to schedule a tele-health visit?  Please advise, thank you.     
Spoke with patient and informed him of the note per . patient states that he will do as told and get this taken care of. He will notify the office if he needs anything else. No further action needed. Thank you!  
[id number] Comprehensive

## 2022-02-16 ENCOUNTER — OFFICE VISIT (OUTPATIENT)
Dept: SPORTS MEDICINE | Facility: CLINIC | Age: 57
End: 2022-02-16

## 2022-02-16 VITALS
HEIGHT: 71 IN | HEART RATE: 54 BPM | WEIGHT: 225 LBS | OXYGEN SATURATION: 98 % | TEMPERATURE: 98.2 F | BODY MASS INDEX: 31.5 KG/M2 | DIASTOLIC BLOOD PRESSURE: 82 MMHG | SYSTOLIC BLOOD PRESSURE: 132 MMHG

## 2022-02-16 DIAGNOSIS — Z12.5 SCREENING FOR PROSTATE CANCER: ICD-10-CM

## 2022-02-16 DIAGNOSIS — E79.0 HYPERURICEMIA: Chronic | ICD-10-CM

## 2022-02-16 DIAGNOSIS — I10 PRIMARY HYPERTENSION: Chronic | ICD-10-CM

## 2022-02-16 DIAGNOSIS — Z00.00 PREVENTATIVE HEALTH CARE: Primary | ICD-10-CM

## 2022-02-16 PROBLEM — D32.9 MENINGIOMA: Chronic | Status: ACTIVE | Noted: 2021-12-15

## 2022-02-16 PROBLEM — D32.9 MENINGIOMA (HCC): Status: ACTIVE | Noted: 2021-12-15

## 2022-02-16 PROCEDURE — 99396 PREV VISIT EST AGE 40-64: CPT | Performed by: FAMILY MEDICINE

## 2022-02-16 RX ORDER — AMLODIPINE AND OLMESARTAN MEDOXOMIL 5; 20 MG/1; MG/1
1 TABLET ORAL DAILY
Qty: 90 TABLET | Refills: 3 | Status: SHIPPED | OUTPATIENT
Start: 2022-02-16 | End: 2022-03-01

## 2022-02-16 RX ORDER — ALLOPURINOL 300 MG/1
300 TABLET ORAL DAILY
Qty: 90 TABLET | Refills: 3 | Status: SHIPPED | OUTPATIENT
Start: 2022-02-16 | End: 2022-03-01

## 2022-02-16 NOTE — PROGRESS NOTES
"Evan Carballo is here today for an annual physical exam.         PHQ-2 Depression Screening  Little interest or pleasure in doing things?  0   Feeling down, depressed, or hopeless?  0   PHQ-2 Total Score  0        I have reviewed the patient's medical, family, and social history in detail and updated the computerized patient record.    Screening history:  Colonoscopy - utd  Prostate - due  Metabolic - last year    Health Maintenance   Topic Date Due   • COVID-19 Vaccine (1) Never done   • ZOSTER VACCINE (1 of 2) Never done   • ANNUAL PHYSICAL  03/17/2021   • INFLUENZA VACCINE  08/01/2021   • TDAP/TD VACCINES (2 - Td or Tdap) 04/24/2027   • COLORECTAL CANCER SCREENING  07/19/2029   • HEPATITIS C SCREENING  Completed   • Pneumococcal Vaccine 0-64  Aged Out       Review of Systems    /82 (BP Location: Left arm, Patient Position: Sitting, Cuff Size: Adult)   Pulse 54   Temp 98.2 °F (36.8 °C) (Temporal)   Ht 180.3 cm (70.98\")   Wt 102 kg (225 lb)   SpO2 98%   BMI 31.40 kg/m²      Physical Exam    Vital signs reviewed.  General appearance: No acute distress  Eyes: conjunctiva clear without erythema; pupils equally round and reactive  ENT: external ears normal; hearing normal  Neck: supple; no thyromegaly  CV: normal rate and rhythm; no peripheral edema  Respiratory: normal respiratory effort; lungs clear to auscultation bilaterally  MSK: normal gait and station; no focal joint deformity or swelling  Skin: no rash or wounds; normal turgor  Neuro: cranial nerves 2-12 grossly intact; normal sensation to light touch  Psych: mood and affect normal; recent and remote memory intact    No visits with results within 2 Week(s) from this visit.   Latest known visit with results is:   Admission on 10/02/2020, Discharged on 10/02/2020   Component Date Value Ref Range Status   • SARS-CoV-2, ZAYRA 10/02/2020 Not Detected  Not Detected Final    Comment: This nucleic acid amplification test was developed and its " performance  characteristics determined by [facility]. Nucleic acid  amplification tests include PCR and TMA. This test has not been FDA  cleared or approved. This test has been authorized by FDA under an  Emergency Use Authorization (EUA). This test is only authorized for  the duration of time the declaration that circumstances exist  justifying the authorization of the emergency use of in vitro  diagnostic tests for detection of SARS-CoV-2 virus and/or diagnosis  of COVID-19 infection under section 564(b)(1) of the Act, 21 U.S.C.  360bbb-3(b) (1), unless the authorization is terminated or revoked  sooner.  When diagnostic testing is negative, the possibility of a false  negative result should be considered in the context of a patient's  recent exposures and the presence of clinical signs and symptoms  consistent with COVID-19. An individual without symptoms of COVID-19  and who is not shedding SARS-CoV-2 virus would                            expect to have a  negative (not detected) result in this assay.   • COVID LABCO PRIORITY 10/02/2020 Comment   Final    Received          Current Outpatient Medications:   •  allopurinol (ZYLOPRIM) 300 MG tablet, TAKE 1 TABLET BY MOUTH EVERY DAY, Disp: 90 tablet, Rfl: 3  •  amlodipine-olmesartan (OTTO) 5-20 MG per tablet, TAKE 1 TABLET BY MOUTH EVERY DAY, Disp: 90 tablet, Rfl: 3  •  colchicine 0.6 MG tablet, 2 at onset of gout symptoms, 1 tablet 1 hour later then 1 tablet daily for 5 days., Disp: 30 tablet, Rfl: 6  •  cyclobenzaprine (FLEXERIL) 10 MG tablet, , Disp: , Rfl:   •  imiquimod (Aldara) 5 % cream, Apply  topically to the appropriate area as directed 3 (Three) Times a Week., Disp: 24 each, Rfl: 5  •  podofilox (CONDYLOX) 0.5 % gel, Apply  topically to the appropriate area as directed 2 (Two) Times a Day., Disp: 3.5 g, Rfl: 1  •  predniSONE (DELTASONE) 10 MG tablet, 6 tabs qd x 3 d, 4 tabs qd x 3 d, 3 tabs qd x 3 d, 2 tabs qd x 3 d,1 tab qd x 3 d, Disp: 48  tablet, Rfl: 0  •  traZODone (DESYREL) 100 MG tablet, One tablet one hour prior to bedtime, Disp: 30 tablet, Rfl: 3    There are no diagnoses linked to this encounter.    Encourage healthy diet and exercise.  Encourage patient to stay up to date on screening examinations as indicated based on age and risk factors.    EMR Dragon/Transcription disclaimer:    Much of this encounter note is an electronic transcription/translation of spoken language to printed text.  The electronic translation of spoken language may permit erroneous, or at times, nonsensical words or phrases to be inadvertently transcribed.  Although I have reviewed the note for such errors some may still exist.

## 2022-02-17 LAB
ALBUMIN SERPL-MCNC: 4.3 G/DL (ref 3.8–4.9)
ALBUMIN/GLOB SERPL: 1.9 {RATIO} (ref 1.2–2.2)
ALP SERPL-CCNC: 63 IU/L (ref 44–121)
ALT SERPL-CCNC: 50 IU/L (ref 0–44)
APPEARANCE UR: CLEAR
AST SERPL-CCNC: 29 IU/L (ref 0–40)
BACTERIA #/AREA URNS HPF: NORMAL /[HPF]
BASOPHILS # BLD AUTO: 0.1 X10E3/UL (ref 0–0.2)
BASOPHILS NFR BLD AUTO: 1 %
BILIRUB SERPL-MCNC: 0.5 MG/DL (ref 0–1.2)
BILIRUB UR QL STRIP: NEGATIVE
BUN SERPL-MCNC: 19 MG/DL (ref 6–24)
BUN/CREAT SERPL: 18 (ref 9–20)
CALCIUM SERPL-MCNC: 9.6 MG/DL (ref 8.7–10.2)
CASTS URNS QL MICRO: NORMAL /LPF
CHLORIDE SERPL-SCNC: 105 MMOL/L (ref 96–106)
CHOLEST SERPL-MCNC: 153 MG/DL (ref 100–199)
CHOLEST/HDLC SERPL: 3.7 RATIO (ref 0–5)
CO2 SERPL-SCNC: 21 MMOL/L (ref 20–29)
COLOR UR: YELLOW
CREAT SERPL-MCNC: 1.07 MG/DL (ref 0.76–1.27)
EOSINOPHIL # BLD AUTO: 0.1 X10E3/UL (ref 0–0.4)
EOSINOPHIL NFR BLD AUTO: 1 %
EPI CELLS #/AREA URNS HPF: NORMAL /HPF (ref 0–10)
ERYTHROCYTE [DISTWIDTH] IN BLOOD BY AUTOMATED COUNT: 13.2 % (ref 11.6–15.4)
GLOBULIN SER CALC-MCNC: 2.3 G/DL (ref 1.5–4.5)
GLUCOSE SERPL-MCNC: 89 MG/DL (ref 65–99)
GLUCOSE UR QL STRIP: NEGATIVE
HBA1C MFR BLD: 5.3 % (ref 4.8–5.6)
HCT VFR BLD AUTO: 46.7 % (ref 37.5–51)
HDLC SERPL-MCNC: 41 MG/DL
HGB BLD-MCNC: 15.8 G/DL (ref 13–17.7)
HGB UR QL STRIP: NEGATIVE
IMM GRANULOCYTES # BLD AUTO: 0 X10E3/UL (ref 0–0.1)
IMM GRANULOCYTES NFR BLD AUTO: 0 %
KETONES UR QL STRIP: NEGATIVE
LDLC SERPL CALC-MCNC: 89 MG/DL (ref 0–99)
LEUKOCYTE ESTERASE UR QL STRIP: NEGATIVE
LYMPHOCYTES # BLD AUTO: 1.9 X10E3/UL (ref 0.7–3.1)
LYMPHOCYTES NFR BLD AUTO: 25 %
MCH RBC QN AUTO: 31.2 PG (ref 26.6–33)
MCHC RBC AUTO-ENTMCNC: 33.8 G/DL (ref 31.5–35.7)
MCV RBC AUTO: 92 FL (ref 79–97)
MICRO URNS: NORMAL
MICRO URNS: NORMAL
MONOCYTES # BLD AUTO: 1 X10E3/UL (ref 0.1–0.9)
MONOCYTES NFR BLD AUTO: 13 %
NEUTROPHILS # BLD AUTO: 4.3 X10E3/UL (ref 1.4–7)
NEUTROPHILS NFR BLD AUTO: 60 %
NITRITE UR QL STRIP: NEGATIVE
PH UR STRIP: 5 [PH] (ref 5–7.5)
PLATELET # BLD AUTO: 298 X10E3/UL (ref 150–450)
POTASSIUM SERPL-SCNC: 4.9 MMOL/L (ref 3.5–5.2)
PROT SERPL-MCNC: 6.6 G/DL (ref 6–8.5)
PROT UR QL STRIP: NEGATIVE
PSA SERPL-MCNC: 0.6 NG/ML (ref 0–4)
RBC # BLD AUTO: 5.06 X10E6/UL (ref 4.14–5.8)
RBC #/AREA URNS HPF: NORMAL /HPF (ref 0–2)
SODIUM SERPL-SCNC: 139 MMOL/L (ref 134–144)
SP GR UR STRIP: 1.02 (ref 1–1.03)
T4 FREE SERPL-MCNC: 1.07 NG/DL (ref 0.82–1.77)
TRIGL SERPL-MCNC: 127 MG/DL (ref 0–149)
TSH SERPL DL<=0.005 MIU/L-ACNC: 3.29 UIU/ML (ref 0.45–4.5)
URATE SERPL-MCNC: 5.1 MG/DL (ref 3.8–8.4)
URINALYSIS REFLEX: NORMAL
UROBILINOGEN UR STRIP-MCNC: 0.2 MG/DL (ref 0.2–1)
VLDLC SERPL CALC-MCNC: 23 MG/DL (ref 5–40)
WBC # BLD AUTO: 7.4 X10E3/UL (ref 3.4–10.8)
WBC #/AREA URNS HPF: NORMAL /HPF (ref 0–5)

## 2022-02-28 DIAGNOSIS — E79.0 HYPERURICEMIA: Chronic | ICD-10-CM

## 2022-03-01 RX ORDER — AMLODIPINE AND OLMESARTAN MEDOXOMIL 5; 20 MG/1; MG/1
TABLET ORAL
Qty: 90 TABLET | Refills: 3 | Status: SHIPPED | OUTPATIENT
Start: 2022-03-01 | End: 2022-04-13 | Stop reason: SDUPTHER

## 2022-03-01 RX ORDER — ALLOPURINOL 300 MG/1
TABLET ORAL
Qty: 90 TABLET | Refills: 3 | Status: SHIPPED | OUTPATIENT
Start: 2022-03-01 | End: 2022-04-13 | Stop reason: ALTCHOICE

## 2022-04-12 ENCOUNTER — TELEPHONE (OUTPATIENT)
Dept: SPORTS MEDICINE | Facility: CLINIC | Age: 57
End: 2022-04-12

## 2022-04-12 NOTE — TELEPHONE ENCOUNTER
HISTORY AND PHYSICAL REPORT  This is a preoperative history and physical report as requested by Dr. Sow for medical clearance to have a left knee arthroscopy on October 17, 2017 at [facility].     HISTORY OF PRESENT ILLNESS  Kt is a 66 year old male with complaint of chronic left knee pain. He cannot remember specific injury however, has pain and swelling of his left knee. It hurts to walk or stand. It oftentimes aches at night. The patient has failed conservative therapy. He underwent an MRI of his left knee which reveals medial and lateral meniscus tears. The patient has been offered and is electing undergo left knee arthroscopy for repair of these tears. He understands risks and benefits of the procedure..     MEDICATIONS  Current Outpatient Prescriptions   Medication Sig   • sotalol (BETAPACE) 80 MG tablet Take 0.5 tablets by mouth 2 times daily.   • dicyclomine (BENTYL) 20 MG tablet Take 1 tablet by mouth 2 times daily as needed (cramping).   • atorvastatin (LIPITOR) 20 MG tablet TAKE 1 TABLET BY MOUTH DAILY   • clopidogrel (PLAVIX) 75 MG tablet TAKE 1 TABLET BY MOUTH DAILY   • Coenzyme Q10 (COQ10 PO) Take 300 mg by mouth daily.   • Ascorbic Acid (VITAMIN C) 500 MG tablet Take 1,000 mg by mouth daily.   • aspirin 81 MG chewable tablet Chew 81 mg by mouth daily.   • Cholecalciferol (VITAMIN D3) 2000 UNITS capsule Take 2,000 Units by mouth daily.   • Omega-3 Fatty Acids (FISH OIL PO) Take 1 capsule by mouth daily.     No current facility-administered medications for this visit.      ALLERGIES:  Augmentin [amoclan]; Biaxin xl [clarithromycin]; Seasonal; and Shell fish    HISTORY  Past Medical History:   Diagnosis Date   • AAA (abdominal aortic aneurysm) (CMS/HCC)    • Acute bronchitis    • Adrenal mass (CMS/HCC)     Right   • Allergy    • Atrial fib/flutter, transient     Paroxysmal   • CAD (coronary artery disease)    • High cholesterol    • Iliac artery aneurysm, bilateral  I think Dr Cr is really good and no need to go to [place], [place] or [place]    (CMS/HCC)    • LAE (left atrial enlargement) 06/19/2012    severe   • LVH (left ventricular hypertrophy) 06/19/2012    mild   • Pneumonia      Past Surgical History:   Procedure Laterality Date   • ARTHROSCOPY SHLD W/ROT CUFF RP Right 9/6/2016   • CARDIOVERSION  09/02/2009    CV 6/6/2011   • COLONOSCOPY REMOVE LESION BY SNARE  04/19/2010   • ECHOCARDIOGRAM  06/19/2012    Report in [facility]. EF: 62%   • INGUINAL HERNIA REPAIR  11/21/2003   • LAPAROSCOPY, CHOLECYSTECTOMY  04/07/2011    Cholecystectomy, laparoscopic   • PTCA WITH STENT  7/19/2011    Xience BABS distal CX, Distal LAD , Vision Bare Metal Prox Cx, Endevour BABS x2 in tandem fashion in proximal to distal BABS   • TONSILLECTOMY AND ADENOIDECTOMY       Family History   Problem Relation Age of Onset   • Cancer Mother      breast ca   • Diabetes Father    • Cancer Father      bladder   • Heart disease Father      aortic valve replacement   • Asthma Father    • Kidney disease Father    • Cancer Daughter      adrenal   • Birth defects Sister      leg length issue      History   Smoking Status   • Former Smoker   • Packs/day: 0.50   • Years: 30.00   • Types: Cigarettes   • Quit date: 8/1/2009   Smokeless Tobacco   • Never Used       REVIEW OF SYSTEMS  Pertinent positives in History of Present Illness.  All other review of systems reviewed and negative.     PHYSICAL EXAMINATION  VITAL SIGNS:    Visit Vitals  /80   Pulse 64   Resp 16   Ht 6' 3\" (1.905 m)   Wt 114.8 kg   BMI 31.62 kg/m²   .  GENERAL:  Well-developed male who appears stated age.  He is alert and oriented x3, and in no acute distress.    SKIN:  Dry.  No rashes.   LYMPH NODES:  No cervical or supraclavicular adenopathy.   HEENT:  Head normocephalic.  Ears are normal bilaterally.  Tympanic membranes are intact and external auditory canals normal.  Extraocular movements intact.  Eyes - Pupils are equal, round, reactive to light and accommodation.  Conjunctivae are clear.  Posterior pharynx without  erythema or exudate.  NECK:  Supple, no palpable abnormalities.  Thyroid midline and symmetric.  Carotid upstrokes equal bilaterally, no bruit.   LUNGS:  Clear to auscultation.  Breath sounds equal bilaterally.   HEART:  S1 and S2 regular.  No murmur.   ABDOMEN:  Soft and nontender.  No masses.  No hepatosplenomegaly.   EXTREMITIES:  No edema, cyanosis or clubbing.        LAB DIAGNOSTICS  All pertinent laboratory results were reviewed.     IMPRESSION  Patient is medically stable to have a left knee arthroscopy with Dr. Sow on October 17, 2017 at [facility].     PLAN  Patient instructed to discontinue all use of aspirin and NSAID therapy.  We will be available in the perioperative period for any questions or concerns.      Please copy Dr. Sow on this report.

## 2022-04-12 NOTE — TELEPHONE ENCOUNTER
Patient called stating that he saw Dr. Cr at [facility]- he is needing brain surgery due to a lesion on the right parietal lobe.     He is wanting you know who would you suggest for Brain surgery?    Would you suggest someone local ? or will he better of going [facility]? [facility] ? [facility]?     Please advise

## 2022-04-13 ENCOUNTER — OFFICE VISIT (OUTPATIENT)
Dept: INTERNAL MEDICINE | Facility: CLINIC | Age: 57
End: 2022-04-13

## 2022-04-13 VITALS
RESPIRATION RATE: 16 BRPM | HEIGHT: 70 IN | OXYGEN SATURATION: 97 % | TEMPERATURE: 96.9 F | WEIGHT: 227 LBS | HEART RATE: 61 BPM | DIASTOLIC BLOOD PRESSURE: 60 MMHG | BODY MASS INDEX: 32.5 KG/M2 | SYSTOLIC BLOOD PRESSURE: 110 MMHG

## 2022-04-13 DIAGNOSIS — D32.9 MENINGIOMA: ICD-10-CM

## 2022-04-13 DIAGNOSIS — I10 PRIMARY HYPERTENSION: Primary | ICD-10-CM

## 2022-04-13 DIAGNOSIS — E79.0 HYPERURICEMIA: Chronic | ICD-10-CM

## 2022-04-13 PROCEDURE — 99214 OFFICE O/P EST MOD 30 MIN: CPT | Performed by: INTERNAL MEDICINE

## 2022-04-13 RX ORDER — ALLOPURINOL 300 MG/1
1 TABLET ORAL DAILY
COMMUNITY
End: 2022-04-13 | Stop reason: SDUPTHER

## 2022-04-13 RX ORDER — ALLOPURINOL 300 MG/1
300 TABLET ORAL DAILY
Qty: 90 TABLET | Refills: 4 | Status: SHIPPED | OUTPATIENT
Start: 2022-04-13

## 2022-04-13 RX ORDER — AMLODIPINE AND OLMESARTAN MEDOXOMIL 5; 20 MG/1; MG/1
1 TABLET ORAL DAILY
Qty: 90 TABLET | Refills: 1 | Status: SHIPPED | OUTPATIENT
Start: 2022-04-13

## 2022-04-13 NOTE — PROGRESS NOTES
"Chief Complaint  Establish Care    Subjective          Evan Carballo presents to [facility]  History of Present Illness  Nice gentleman new to my practice.  He is here for mainly for his hypertension and gout follow-up as his other physician retired.  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  He has been seen at [facility] and [place] recently.  Trying to decide where to get the surgery done.  His last MRI was in January 2022  Objective   Vital Signs:   /60 (BP Location: Left arm, Patient Position: Sitting, Cuff Size: Large Adult)   Pulse 61   Temp 96.9 °F (36.1 °C) (Temporal)   Resp 16   Ht 177.8 cm (70\")   Wt 103 kg (227 lb)   SpO2 97%   BMI 32.57 kg/m²            Physical Exam  Vitals and nursing note reviewed.   Constitutional:       Appearance: Normal appearance.   HENT:      Head: Normocephalic and atraumatic.   Cardiovascular:      Rate and Rhythm: Normal rate and regular rhythm.   Pulmonary:      Effort: Pulmonary effort is normal.      Breath sounds: Normal breath sounds.   Abdominal:      General: Abdomen is flat.      Palpations: Abdomen is soft.   Musculoskeletal:         General: No swelling or deformity.      Cervical back: Neck supple.      Right lower leg: No edema.      Left lower leg: No edema.   Skin:     General: Skin is warm.      Capillary Refill: Capillary refill takes less than 2 seconds.      Findings: No rash.   Neurological:      General: No focal deficit present.      Mental Status: He is alert and oriented to person, place, and time.        Result Review :MRI brain and previous notes                  Assessment and Plan    Diagnoses and all orders for this visit:    1. Primary hypertension (Primary)    2. Hyperuricemia  -     amlodipine-olmesartan (OTTO) 5-20 MG per tablet; Take 1 tablet by mouth Daily.  Dispense: 90 tablet; Refill: 1    3. " Meningioma (HCC)    Other orders  -     allopurinol (ZYLOPRIM) 300 MG tablet; Take 1 tablet by mouth Daily.  Dispense: 90 tablet; Refill: 4        Follow Up   Return in about 6 months (around 10/13/2022).  Patient was given instructions and counseling regarding his condition or for health maintenance advice. Please see specific information pulled into the AVS if appropriate.

## 2023-06-09 DIAGNOSIS — E79.0 HYPERURICEMIA: Chronic | ICD-10-CM

## 2023-06-09 RX ORDER — AMLODIPINE AND OLMESARTAN MEDOXOMIL 5; 20 MG/1; MG/1
TABLET ORAL
Qty: 90 TABLET | Refills: 0 | OUTPATIENT
Start: 2023-06-09

## 2023-06-11 RX ORDER — ALLOPURINOL 300 MG/1
TABLET ORAL
Qty: 90 TABLET | Refills: 0 | OUTPATIENT
Start: 2023-06-11

## 2023-06-19 DIAGNOSIS — E79.0 HYPERURICEMIA: Chronic | ICD-10-CM

## 2023-06-19 RX ORDER — AMLODIPINE AND OLMESARTAN MEDOXOMIL 5; 20 MG/1; MG/1
1 TABLET ORAL DAILY
Qty: 90 TABLET | Refills: 1 | Status: SHIPPED | OUTPATIENT
Start: 2023-06-19

## 2023-06-19 NOTE — TELEPHONE ENCOUNTER
Caller: Evan Carballo    Relationship: Self    Best call back number: [phone]    Requested Prescriptions:   Requested Prescriptions     Pending Prescriptions Disp Refills    amlodipine-olmesartan (OTTO) 5-20 MG per tablet 90 tablet 1     Sig: Take 1 tablet by mouth Daily.      Pharmacy where request should be sent: [facility] [address] [place] [address] [address] - [phone] PH - [phone] FX     Last office visit with prescribing clinician: 4/13/2022   Last telemedicine visit with prescribing clinician: Visit date not found   Next office visit with prescribing clinician: 8/22/2023     Additional details provided by patient: PT IS OUT OF MEDICATION.     Does the patient have less than a 3 day supply:  [x] Yes  [] No    Would you like a call back once the refill request has been completed: [] Yes [x] No    If the office needs to give you a call back, can they leave a voicemail: [] Yes [] No    Jocelyn Bush   06/19/23 09:17 EDT

## 2023-07-24 RX ORDER — ALLOPURINOL 300 MG/1
TABLET ORAL
Qty: 90 TABLET | Refills: 0 | Status: SHIPPED | OUTPATIENT
Start: 2023-07-24

## 2023-08-22 ENCOUNTER — OFFICE VISIT (OUTPATIENT)
Dept: INTERNAL MEDICINE | Facility: CLINIC | Age: 58
End: 2023-08-22
Payer: COMMERCIAL

## 2023-08-22 VITALS
WEIGHT: 224 LBS | SYSTOLIC BLOOD PRESSURE: 112 MMHG | HEART RATE: 62 BPM | TEMPERATURE: 97.3 F | OXYGEN SATURATION: 98 % | RESPIRATION RATE: 16 BRPM | DIASTOLIC BLOOD PRESSURE: 78 MMHG | BODY MASS INDEX: 32.07 KG/M2 | HEIGHT: 70 IN

## 2023-08-22 DIAGNOSIS — D32.9 MENINGIOMA: Chronic | ICD-10-CM

## 2023-08-22 DIAGNOSIS — Z12.5 SPECIAL SCREENING FOR MALIGNANT NEOPLASM OF PROSTATE: ICD-10-CM

## 2023-08-22 DIAGNOSIS — Z00.00 ROUTINE GENERAL MEDICAL EXAMINATION AT A HEALTH CARE FACILITY: Primary | ICD-10-CM

## 2023-08-22 PROCEDURE — 93000 ELECTROCARDIOGRAM COMPLETE: CPT | Performed by: INTERNAL MEDICINE

## 2023-08-22 PROCEDURE — 99396 PREV VISIT EST AGE 40-64: CPT | Performed by: INTERNAL MEDICINE

## 2023-08-22 NOTE — PROGRESS NOTES
"Chief Complaint   Patient presents with    Annual Exam    Suspicious Skin Lesion     Right foot and back        Subjective   Evan Carballo is a 58 y.o. male here for   Chief Complaint   Patient presents with    Annual Exam    Suspicious Skin Lesion     Right foot and back    .    History of Present Illness     The following portions of the patient's history were reviewed and updated as appropriate: allergies, current medications, past family history, past medical history, past social history, past surgical history, and problem list.  Here for the physical today.  Overall doing very well.  No specific concerns.  We talked about diet and exercise habits.  Discussed prevention recommendations.   Review of Systems   Constitutional: Negative.    HENT:  Negative for congestion, drooling, facial swelling, hearing loss, mouth sores, rhinorrhea, sinus pressure, sore throat, swollen glands, tinnitus and trouble swallowing.    Eyes: Negative.    Respiratory: Negative.     Cardiovascular: Negative.    Gastrointestinal: Negative.    Endocrine: Negative.    Genitourinary:  Negative for difficulty urinating, dysuria, flank pain, frequency, hematuria and urgency.   Musculoskeletal:  Negative for arthralgias and myalgias.   Skin:  Negative for color change, dry skin, rash and wound.   Neurological:  Negative for dizziness, tremors, weakness, headache, memory problem and confusion.   Hematological:  Negative for adenopathy. Does not bruise/bleed easily.   Psychiatric/Behavioral:  Negative for agitation, behavioral problems, depressed mood and stress. The patient is not nervous/anxious.      Body mass index is 32.14 kg/mý.   Vitals:    08/22/23 0933   BP: 112/78   BP Location: Left arm   Patient Position: Sitting   Cuff Size: Large Adult   Pulse: 62   Resp: 16   Temp: 97.3 øF (36.3 øC)   TempSrc: Temporal   SpO2: 98%   Weight: 102 kg (224 lb)   Height: 177.8 cm (70\")        Objective   Physical Exam  Vitals and nursing note reviewed. "   Constitutional:       Appearance: Normal appearance.   HENT:      Head: Normocephalic and atraumatic.   Cardiovascular:      Rate and Rhythm: Normal rate and regular rhythm.   Pulmonary:      Effort: Pulmonary effort is normal.      Breath sounds: Normal breath sounds.   Abdominal:      General: Abdomen is flat.      Palpations: Abdomen is soft.   Musculoskeletal:         General: No swelling or deformity.      Cervical back: Neck supple.      Right lower leg: No edema.      Left lower leg: No edema.   Skin:     General: Skin is warm.      Capillary Refill: Capillary refill takes less than 2 seconds.      Findings: No rash.   Neurological:      General: No focal deficit present.      Mental Status: He is alert and oriented to person, place, and time.       Lab Results   Component Value Date    HGBA1C 5.3 02/16/2022    TSH 3.290 02/16/2022    PSA 0.6 02/16/2022        Health Maintenance   Topic Date Due    COVID-19 Vaccine (4 - Moderna series) 03/06/2022    ANNUAL PHYSICAL  02/16/2023    INFLUENZA VACCINE  10/01/2023    TDAP/TD VACCINES (2 - Td or Tdap) 04/24/2027    COLORECTAL CANCER SCREENING  07/19/2029    HEPATITIS C SCREENING  Completed    ZOSTER VACCINE  Completed    Pneumococcal Vaccine 0-64  Aged Out        Assessment & Plan   Problems Addressed this Visit          Hematology and Neoplasia    Meningioma (Chronic)     Other Visit Diagnoses       Routine general medical examination at a health care facility    -  Primary    Relevant Orders    Urinalysis With Microscopic - Urine, Clean Catch    CBC & Differential    Comprehensive Metabolic Panel    Hemoglobin A1c    TSH    Testosterone    Lipid Panel With / Chol / HDL Ratio    PSA Screen    ECG 12 Lead    Special screening for malignant neoplasm of prostate        Relevant Orders    Urinalysis With Microscopic - Urine, Clean Catch    CBC & Differential    Comprehensive Metabolic Panel    Hemoglobin A1c    TSH    Testosterone    Lipid Panel With / Chol / HDL  Ratio    PSA Screen          Diagnoses         Codes Comments    Routine general medical examination at a health care facility    -  Primary ICD-10-CM: Z00.00  ICD-9-CM: V70.0     Special screening for malignant neoplasm of prostate     ICD-10-CM: Z12.5  ICD-9-CM: V76.44     Meningioma     ICD-10-CM: D32.9  ICD-9-CM: 225.2 A?P Craniotomy and resection 2022 Dr. Cr( [facility])            No orders of the defined types were placed in this encounter.     ECG 12 Lead    Date/Time: 8/22/2023 11:07 AM  Performed by: Jonathan Swenson MD (Tony)  Authorized by: Jonathan Swenson MD (Tony)   Comparison: not compared with previous ECG   Rhythm: sinus rhythm  Rate: normal  Conduction: conduction normal  ST Segments: ST segments normal  T Waves: T waves normal  T inversion: aVR  T flattening: III  QRS axis: normal    Clinical impression: normal ECG  Comments: Subtle scooping of the ST segment, I think normal         Overall he is doing well.  He had a craniotomy for meningioma removal last year and seems to be doing well.  He has no real complaints.  He has a little bit of left elbow lateral epicondylitis but did not want any injection today.  Recheck lab work.  He is going to focus more on some weight reduction and exercise.  Follow-up pending lab results and yearly wellness  Depression screen reviewed with patient and negative.  Advised behavioral modifications including dietary changes and increased exercise with goal of healthy weight and lifestyle.   Will make follow up plans based on lab results as above.          Return in about 1 year (around 8/22/2024) for Annual physical.

## 2023-08-23 ENCOUNTER — TELEPHONE (OUTPATIENT)
Dept: INTERNAL MEDICINE | Facility: CLINIC | Age: 58
End: 2023-08-23
Payer: COMMERCIAL

## 2023-08-23 DIAGNOSIS — Z00.00 ROUTINE GENERAL MEDICAL EXAMINATION AT A HEALTH CARE FACILITY: Primary | ICD-10-CM

## 2023-08-23 LAB
ALBUMIN SERPL-MCNC: 4.9 G/DL (ref 3.5–5.2)
ALBUMIN/GLOB SERPL: 2.5 G/DL
ALP SERPL-CCNC: 79 U/L (ref 39–117)
ALT SERPL-CCNC: 66 U/L (ref 1–41)
APPEARANCE UR: CLEAR
AST SERPL-CCNC: 44 U/L (ref 1–40)
BACTERIA #/AREA URNS HPF: NORMAL /HPF
BASOPHILS # BLD AUTO: 0.11 10*3/MM3 (ref 0–0.2)
BASOPHILS NFR BLD AUTO: 1.5 % (ref 0–1.5)
BILIRUB SERPL-MCNC: 0.9 MG/DL (ref 0–1.2)
BILIRUB UR QL STRIP: NEGATIVE
BUN SERPL-MCNC: 25 MG/DL (ref 6–20)
BUN/CREAT SERPL: 16.3 (ref 7–25)
CALCIUM SERPL-MCNC: 10 MG/DL (ref 8.6–10.5)
CASTS URNS MICRO: NORMAL
CHLORIDE SERPL-SCNC: 101 MMOL/L (ref 98–107)
CHOLEST SERPL-MCNC: 154 MG/DL (ref 0–200)
CHOLEST/HDLC SERPL: 3.67 {RATIO}
CO2 SERPL-SCNC: 24.2 MMOL/L (ref 22–29)
COLOR UR: YELLOW
CREAT SERPL-MCNC: 1.53 MG/DL (ref 0.76–1.27)
EGFRCR SERPLBLD CKD-EPI 2021: 52 ML/MIN/1.73
EOSINOPHIL # BLD AUTO: 0.14 10*3/MM3 (ref 0–0.4)
EOSINOPHIL NFR BLD AUTO: 1.8 % (ref 0.3–6.2)
EPI CELLS #/AREA URNS HPF: NORMAL /HPF
ERYTHROCYTE [DISTWIDTH] IN BLOOD BY AUTOMATED COUNT: 13.4 % (ref 12.3–15.4)
GLOBULIN SER CALC-MCNC: 2 GM/DL
GLUCOSE SERPL-MCNC: 88 MG/DL (ref 65–99)
GLUCOSE UR QL STRIP: NEGATIVE
HBA1C MFR BLD: 5.4 % (ref 4.8–5.6)
HCT VFR BLD AUTO: 48.4 % (ref 37.5–51)
HDLC SERPL-MCNC: 42 MG/DL (ref 40–60)
HGB BLD-MCNC: 17 G/DL (ref 13–17.7)
HGB UR QL STRIP: NEGATIVE
IMM GRANULOCYTES # BLD AUTO: 0.03 10*3/MM3 (ref 0–0.05)
IMM GRANULOCYTES NFR BLD AUTO: 0.4 % (ref 0–0.5)
KETONES UR QL STRIP: NEGATIVE
LDLC SERPL CALC-MCNC: 95 MG/DL (ref 0–100)
LEUKOCYTE ESTERASE UR QL STRIP: NEGATIVE
LYMPHOCYTES # BLD AUTO: 1.99 10*3/MM3 (ref 0.7–3.1)
LYMPHOCYTES NFR BLD AUTO: 26.3 % (ref 19.6–45.3)
MCH RBC QN AUTO: 31.2 PG (ref 26.6–33)
MCHC RBC AUTO-ENTMCNC: 35.1 G/DL (ref 31.5–35.7)
MCV RBC AUTO: 88.8 FL (ref 79–97)
MONOCYTES # BLD AUTO: 1.21 10*3/MM3 (ref 0.1–0.9)
MONOCYTES NFR BLD AUTO: 16 % (ref 5–12)
NEUTROPHILS # BLD AUTO: 4.1 10*3/MM3 (ref 1.7–7)
NEUTROPHILS NFR BLD AUTO: 54 % (ref 42.7–76)
NITRITE UR QL STRIP: NEGATIVE
NRBC BLD AUTO-RTO: 0 /100 WBC (ref 0–0.2)
PH UR STRIP: 5.5 [PH] (ref 5–8)
PLATELET # BLD AUTO: 412 10*3/MM3 (ref 140–450)
POTASSIUM SERPL-SCNC: 5.5 MMOL/L (ref 3.5–5.2)
PROT SERPL-MCNC: 6.9 G/DL (ref 6–8.5)
PROT UR QL STRIP: NEGATIVE
PSA SERPL-MCNC: 0.7 NG/ML (ref 0–4)
RBC # BLD AUTO: 5.45 10*6/MM3 (ref 4.14–5.8)
RBC #/AREA URNS HPF: NORMAL /HPF
SODIUM SERPL-SCNC: 139 MMOL/L (ref 136–145)
SP GR UR STRIP: 1.02 (ref 1–1.03)
TESTOST SERPL-MCNC: 488 NG/DL (ref 264–916)
TRIGL SERPL-MCNC: 91 MG/DL (ref 0–150)
TSH SERPL DL<=0.005 MIU/L-ACNC: 2.95 UIU/ML (ref 0.27–4.2)
UROBILINOGEN UR STRIP-MCNC: NORMAL MG/DL
VLDLC SERPL CALC-MCNC: 17 MG/DL (ref 5–40)
WBC # BLD AUTO: 7.58 10*3/MM3 (ref 3.4–10.8)
WBC #/AREA URNS HPF: NORMAL /HPF

## 2023-08-23 NOTE — TELEPHONE ENCOUNTER
Talk to the patient about his labs.  I think that BUN/creatinine and LFTs probably falsely elevated.  He is going to come back next week to repeat his CMP.  No changes until confirmatory test.  He has been out the day before working in the heat all day and felt like he was dehydrated prior to coming in.

## 2023-10-13 RX ORDER — ALLOPURINOL 300 MG/1
TABLET ORAL
Qty: 90 TABLET | Refills: 0 | Status: SHIPPED | OUTPATIENT
Start: 2023-10-13

## 2023-10-13 NOTE — TELEPHONE ENCOUNTER
Rx Refill Note  Requested Prescriptions     Pending Prescriptions Disp Refills    allopurinol (ZYLOPRIM) 300 MG tablet [Pharmacy Med Name: Allopurinol 300 MG Oral Tablet] 90 tablet 0     Sig: Take 1 tablet by mouth once daily      Last office visit with prescribing clinician: 8/22/2023   Last telemedicine visit with prescribing clinician: Visit date not found   Next office visit with prescribing clinician: Visit date not found                         Would you like a call back once the refill request has been completed: [] Yes [] No    If the office needs to give you a call back, can they leave a voicemail: [] Yes [] No    Nery Cosby MA  10/13/23, 09:22 EDT

## 2023-10-18 DIAGNOSIS — E79.0 HYPERURICEMIA: Chronic | ICD-10-CM

## 2023-10-18 NOTE — TELEPHONE ENCOUNTER
Caller: Evan Carballo    Relationship: Self    Best call back number: [phone]     Requested Prescriptions:   Requested Prescriptions     Pending Prescriptions Disp Refills    amlodipine-olmesartan (OTTO) 5-20 MG per tablet 90 tablet 1     Sig: Take 1 tablet by mouth Daily.        Pharmacy where request should be sent: [facility] [address] [place] [address] [address] - [phone] PH - [phone] FX     Last office visit with prescribing clinician: 8/22/2023   Last telemedicine visit with prescribing clinician: Visit date not found   Next office visit with prescribing clinician: Visit date not found         Does the patient have less than a 3 day supply:  [] Yes  [x] No      Jocelyn Chen Rep   10/18/23 11:18 EDT

## 2023-10-19 RX ORDER — AMLODIPINE AND OLMESARTAN MEDOXOMIL 5; 20 MG/1; MG/1
1 TABLET ORAL DAILY
Qty: 90 TABLET | Refills: 1 | Status: SHIPPED | OUTPATIENT
Start: 2023-10-19

## 2024-01-24 RX ORDER — ALLOPURINOL 300 MG/1
TABLET ORAL
Qty: 90 TABLET | Refills: 0 | Status: SHIPPED | OUTPATIENT
Start: 2024-01-24

## 2024-04-18 RX ORDER — ALLOPURINOL 300 MG/1
TABLET ORAL
Qty: 90 TABLET | Refills: 0 | Status: SHIPPED | OUTPATIENT
Start: 2024-04-18

## 2024-06-04 ENCOUNTER — TELEPHONE (OUTPATIENT)
Dept: INTERNAL MEDICINE | Facility: CLINIC | Age: 59
End: 2024-06-04

## 2024-06-04 NOTE — TELEPHONE ENCOUNTER
Caller: Pily Carballo    Relationship to patient: Emergency Contact    Best call back number: [phone]     Type of visit: PHYSICAL    Requested date: AFTER 8/22/24    Additional notes: PATIENT'S WIFE CALLED TO SCHEDULE ANNUAL PHYSICAL. NO APPOINTMENTS WITH PCP AVAILABLE UNTIL DECEMBER. REQUESTS CALL BACK TO TRY TO SCHEDULE SOONER, OR CONFIRM IF MEDICATIONS WILL STILL BE REFILLED UNTIL PHYSICAL CAN BE SCHEDULED LATER

## 2024-06-10 DIAGNOSIS — E79.0 HYPERURICEMIA: Chronic | ICD-10-CM

## 2024-06-10 RX ORDER — AMLODIPINE AND OLMESARTAN MEDOXOMIL 5; 20 MG/1; MG/1
1 TABLET ORAL DAILY
Qty: 90 TABLET | Refills: 4 | Status: SHIPPED | OUTPATIENT
Start: 2024-06-10

## 2024-07-15 RX ORDER — ALLOPURINOL 300 MG/1
TABLET ORAL
Qty: 90 TABLET | Refills: 0 | Status: SHIPPED | OUTPATIENT
Start: 2024-07-15

## 2024-07-15 NOTE — TELEPHONE ENCOUNTER
Rx Refill Note  Requested Prescriptions     Pending Prescriptions Disp Refills    allopurinol (ZYLOPRIM) 300 MG tablet [Pharmacy Med Name: Allopurinol 300 MG Oral Tablet] 90 tablet 0     Sig: Take 1 tablet by mouth once daily      Last office visit with prescribing clinician: 8/22/2023   Last telemedicine visit with prescribing clinician: Visit date not found   Next office visit with prescribing clinician: 8/23/2024                         Would you like a call back once the refill request has been completed: [] Yes [] No    If the office needs to give you a call back, can they leave a voicemail: [] Yes [] No    Mary Meyer MA  07/15/24, 09:21 EDT

## 2024-08-26 ENCOUNTER — TELEPHONE (OUTPATIENT)
Dept: INTERNAL MEDICINE | Facility: CLINIC | Age: 59
End: 2024-08-26

## 2024-08-27 ENCOUNTER — LAB (OUTPATIENT)
Dept: INTERNAL MEDICINE | Facility: CLINIC | Age: 59
End: 2024-08-27
Payer: COMMERCIAL

## 2024-08-27 DIAGNOSIS — Z00.00 ROUTINE GENERAL MEDICAL EXAMINATION AT A HEALTH CARE FACILITY: Primary | ICD-10-CM

## 2024-08-28 LAB
ALBUMIN SERPL-MCNC: 4.5 G/DL (ref 3.5–5.2)
ALBUMIN/GLOB SERPL: 1.9 G/DL
ALP SERPL-CCNC: 74 U/L (ref 39–117)
ALT SERPL-CCNC: 76 U/L (ref 1–41)
APPEARANCE UR: CLEAR
AST SERPL-CCNC: 41 U/L (ref 1–40)
BACTERIA #/AREA URNS HPF: NORMAL /HPF
BASOPHILS # BLD AUTO: 0.12 10*3/MM3 (ref 0–0.2)
BASOPHILS NFR BLD AUTO: 1.5 % (ref 0–1.5)
BILIRUB SERPL-MCNC: 0.7 MG/DL (ref 0–1.2)
BILIRUB UR QL STRIP: NEGATIVE
BUN SERPL-MCNC: 17 MG/DL (ref 8–23)
BUN/CREAT SERPL: 14 (ref 7–25)
CALCIUM SERPL-MCNC: 9.5 MG/DL (ref 8.6–10.5)
CASTS URNS MICRO: NORMAL
CHLORIDE SERPL-SCNC: 101 MMOL/L (ref 98–107)
CHOLEST SERPL-MCNC: 159 MG/DL (ref 0–200)
CHOLEST/HDLC SERPL: 4.08 {RATIO}
CO2 SERPL-SCNC: 24.6 MMOL/L (ref 22–29)
COLOR UR: YELLOW
CREAT SERPL-MCNC: 1.21 MG/DL (ref 0.76–1.27)
EGFRCR SERPLBLD CKD-EPI 2021: 68.5 ML/MIN/1.73
EOSINOPHIL # BLD AUTO: 0.26 10*3/MM3 (ref 0–0.4)
EOSINOPHIL NFR BLD AUTO: 3.3 % (ref 0.3–6.2)
EPI CELLS #/AREA URNS HPF: NORMAL /HPF
ERYTHROCYTE [DISTWIDTH] IN BLOOD BY AUTOMATED COUNT: 13.2 % (ref 12.3–15.4)
GLOBULIN SER CALC-MCNC: 2.4 GM/DL
GLUCOSE SERPL-MCNC: 90 MG/DL (ref 65–99)
GLUCOSE UR QL STRIP: NEGATIVE
HBA1C MFR BLD: 5.3 % (ref 4.8–5.6)
HCT VFR BLD AUTO: 48.2 % (ref 37.5–51)
HDLC SERPL-MCNC: 39 MG/DL (ref 40–60)
HGB BLD-MCNC: 16 G/DL (ref 13–17.7)
HGB UR QL STRIP: NEGATIVE
IMM GRANULOCYTES # BLD AUTO: 0.03 10*3/MM3 (ref 0–0.05)
IMM GRANULOCYTES NFR BLD AUTO: 0.4 % (ref 0–0.5)
KETONES UR QL STRIP: NEGATIVE
LDLC SERPL CALC-MCNC: 95 MG/DL (ref 0–100)
LEUKOCYTE ESTERASE UR QL STRIP: NEGATIVE
LYMPHOCYTES # BLD AUTO: 2.17 10*3/MM3 (ref 0.7–3.1)
LYMPHOCYTES NFR BLD AUTO: 27.6 % (ref 19.6–45.3)
MCH RBC QN AUTO: 31.1 PG (ref 26.6–33)
MCHC RBC AUTO-ENTMCNC: 33.2 G/DL (ref 31.5–35.7)
MCV RBC AUTO: 93.6 FL (ref 79–97)
MONOCYTES # BLD AUTO: 0.97 10*3/MM3 (ref 0.1–0.9)
MONOCYTES NFR BLD AUTO: 12.4 % (ref 5–12)
NEUTROPHILS # BLD AUTO: 4.3 10*3/MM3 (ref 1.7–7)
NEUTROPHILS NFR BLD AUTO: 54.8 % (ref 42.7–76)
NITRITE UR QL STRIP: NEGATIVE
NRBC BLD AUTO-RTO: 0 /100 WBC (ref 0–0.2)
PH UR STRIP: 6 [PH] (ref 5–8)
PLATELET # BLD AUTO: 358 10*3/MM3 (ref 140–450)
POTASSIUM SERPL-SCNC: 4.6 MMOL/L (ref 3.5–5.2)
PROT SERPL-MCNC: 6.9 G/DL (ref 6–8.5)
PROT UR QL STRIP: NEGATIVE
PSA SERPL-MCNC: 0.8 NG/ML (ref 0–4)
RBC # BLD AUTO: 5.15 10*6/MM3 (ref 4.14–5.8)
RBC #/AREA URNS HPF: NORMAL /HPF
SODIUM SERPL-SCNC: 139 MMOL/L (ref 136–145)
SP GR UR STRIP: 1.02 (ref 1–1.03)
TESTOST SERPL-MCNC: 607 NG/DL (ref 264–916)
TRIGL SERPL-MCNC: 138 MG/DL (ref 0–150)
TSH SERPL DL<=0.005 MIU/L-ACNC: 3.81 UIU/ML (ref 0.27–4.2)
UROBILINOGEN UR STRIP-MCNC: NORMAL MG/DL
VLDLC SERPL CALC-MCNC: 25 MG/DL (ref 5–40)
WBC # BLD AUTO: 7.85 10*3/MM3 (ref 3.4–10.8)
WBC #/AREA URNS HPF: NORMAL /HPF

## 2024-08-30 ENCOUNTER — OFFICE VISIT (OUTPATIENT)
Dept: INTERNAL MEDICINE | Facility: CLINIC | Age: 59
End: 2024-08-30
Payer: COMMERCIAL

## 2024-08-30 VITALS
HEART RATE: 64 BPM | RESPIRATION RATE: 16 BRPM | OXYGEN SATURATION: 96 % | DIASTOLIC BLOOD PRESSURE: 78 MMHG | BODY MASS INDEX: 32.35 KG/M2 | HEIGHT: 70 IN | WEIGHT: 226 LBS | TEMPERATURE: 97.7 F | SYSTOLIC BLOOD PRESSURE: 110 MMHG

## 2024-08-30 DIAGNOSIS — E79.0 HYPERURICEMIA: Chronic | ICD-10-CM

## 2024-08-30 DIAGNOSIS — I10 PRIMARY HYPERTENSION: Primary | Chronic | ICD-10-CM

## 2024-08-30 PROCEDURE — 99396 PREV VISIT EST AGE 40-64: CPT | Performed by: INTERNAL MEDICINE

## 2024-08-30 RX ORDER — TRIAMCINOLONE ACETONIDE 1 MG/G
1 CREAM TOPICAL 2 TIMES DAILY
Qty: 60 G | Refills: 2 | Status: SHIPPED | OUTPATIENT
Start: 2024-08-30

## 2024-08-30 RX ORDER — AMLODIPINE AND OLMESARTAN MEDOXOMIL 5; 20 MG/1; MG/1
1 TABLET ORAL DAILY
Qty: 90 TABLET | Refills: 4 | Status: SHIPPED | OUTPATIENT
Start: 2024-08-30

## 2024-08-30 RX ORDER — ALLOPURINOL 300 MG/1
300 TABLET ORAL DAILY
Qty: 90 TABLET | Refills: 4 | Status: SHIPPED | OUTPATIENT
Start: 2024-08-30

## 2024-08-30 NOTE — PROGRESS NOTES
"Chief Complaint   Patient presents with    Annual Exam       Subjective   Evan Carballo is a 59 y.o. male here for   Chief Complaint   Patient presents with    Annual Exam   .    History of Present Illness     The following portions of the patient's history were reviewed and updated as appropriate: allergies, current medications, past family history, past medical history, past social history, past surgical history, and problem list.  Here for the physical today.  Overall doing very well.  No specific concerns.  We talked about diet and exercise habits.  Discussed prevention recommendations.   Review of Systems   Constitutional: Negative.    HENT:  Negative for congestion, drooling, facial swelling, hearing loss, mouth sores, rhinorrhea, sinus pressure, sore throat, swollen glands, tinnitus and trouble swallowing.    Eyes: Negative.    Respiratory: Negative.     Cardiovascular: Negative.    Gastrointestinal: Negative.    Endocrine: Negative.    Genitourinary:  Negative for difficulty urinating, dysuria, flank pain, frequency, hematuria and urgency.   Musculoskeletal:  Negative for arthralgias and myalgias.   Skin:  Negative for color change, dry skin, rash and wound.   Neurological:  Negative for dizziness, tremors, weakness, headache, memory problem and confusion.   Hematological:  Negative for adenopathy. Does not bruise/bleed easily.   Psychiatric/Behavioral:  Negative for agitation, behavioral problems, depressed mood and stress. The patient is not nervous/anxious.        Body mass index is 32.43 kg/m².   Vitals:    08/30/24 1411   BP: 110/78   BP Location: Left arm   Patient Position: Sitting   Cuff Size: Large Adult   Pulse: 64   Resp: 16   Temp: 97.7 °F (36.5 °C)   TempSrc: Temporal   SpO2: 96%   Weight: 103 kg (226 lb)   Height: 177.8 cm (70\")        Objective   Physical Exam  Vitals and nursing note reviewed.   Constitutional:       Appearance: Normal appearance.   HENT:      Head: Normocephalic and " atraumatic.   Cardiovascular:      Rate and Rhythm: Normal rate and regular rhythm.   Pulmonary:      Effort: Pulmonary effort is normal.      Breath sounds: Normal breath sounds.   Abdominal:      General: Abdomen is flat.      Palpations: Abdomen is soft.   Musculoskeletal:         General: No swelling or deformity.      Cervical back: Neck supple.      Right lower leg: No edema.      Left lower leg: No edema.   Skin:     General: Skin is warm.      Capillary Refill: Capillary refill takes less than 2 seconds.      Findings: No rash.   Neurological:      General: No focal deficit present.      Mental Status: He is alert and oriented to person, place, and time.         Lab Results   Component Value Date    HGBA1C 5.30 08/27/2024    TSH 3.810 08/27/2024    PSA 0.803 08/27/2024        Health Maintenance   Topic Date Due    BMI FOLLOWUP  Never done    COVID-19 Vaccine (4 - 2023-24 season) 09/01/2023    ANNUAL PHYSICAL  08/22/2024    INFLUENZA VACCINE  08/01/2024    TDAP/TD VACCINES (2 - Td or Tdap) 04/24/2027    COLORECTAL CANCER SCREENING  07/19/2029    HEPATITIS C SCREENING  Completed    ZOSTER VACCINE  Completed    Pneumococcal Vaccine 0-64  Aged Out        Assessment & Plan   Problems Addressed this Visit          Cardiac and Vasculature    Hypertension - Primary (Chronic)    Relevant Medications    amlodipine-olmesartan (OTTO) 5-20 MG per tablet       Musculoskeletal and Injuries    Hyperuricemia (Chronic)    Relevant Medications    amlodipine-olmesartan (OTTO) 5-20 MG per tablet     Diagnoses         Codes Comments    Primary hypertension    -  Primary ICD-10-CM: I10  ICD-9-CM: 401.9     Hyperuricemia     ICD-10-CM: E79.0  ICD-9-CM: 790.6             No orders of the defined types were placed in this encounter.     Nice gentleman here for physical overall has been doing well.  Has some mild function elevation and likely fatty liver.  No significant EtOH use.  We talked about dietary avoidance of processed food  and processed sugars and carbohydrates.  We can recheck hepatic function profile in 6 months.  BMI is >= 30 and <35. (Class 1 Obesity). The following options were offered after discussion;: exercise counseling/recommendations   Hypertension stable.  Refills on medications given.  History of gout on allopurinol with refill of medication given.  No recent flareups of gout.  Recheck for yearly wellness  Depression screen reviewed with patient and negative.  Advised behavioral modifications including dietary changes and increased exercise with goal of healthy weight and lifestyle.   Will make follow up plans based on lab results as above.          Return in about 6 months (around 2/28/2025) for Annual physical.

## 2025-03-31 ENCOUNTER — LAB (OUTPATIENT)
Dept: INTERNAL MEDICINE | Facility: CLINIC | Age: 60
End: 2025-03-31
Payer: COMMERCIAL

## 2025-03-31 DIAGNOSIS — R79.89 ELEVATED LFTS: Primary | ICD-10-CM

## 2025-04-01 LAB
ALBUMIN SERPL-MCNC: 4.9 G/DL (ref 3.9–4.9)
ALP SERPL-CCNC: 76 IU/L (ref 44–121)
ALT SERPL-CCNC: 64 IU/L (ref 0–44)
AST SERPL-CCNC: 42 IU/L (ref 0–40)
BILIRUB DIRECT SERPL-MCNC: 0.19 MG/DL (ref 0–0.4)
BILIRUB SERPL-MCNC: 0.4 MG/DL (ref 0–1.2)
CERULOPLASMIN SERPL-MCNC: NORMAL MG/DL
FERRITIN SERPL-MCNC: NORMAL NG/ML
HBV CORE IGM SERPL QL IA: NORMAL
HBV SURFACE AB SER QL: NORMAL
HBV SURFACE AG SERPL QL IA: NORMAL
PROT SERPL-MCNC: 7.2 G/DL (ref 6–8.5)
SPECIMEN STATUS: NORMAL
WRITTEN AUTHORIZATION: NORMAL

## 2025-04-09 ENCOUNTER — OFFICE VISIT (OUTPATIENT)
Dept: INTERNAL MEDICINE | Facility: CLINIC | Age: 60
End: 2025-04-09
Payer: COMMERCIAL

## 2025-04-09 VITALS
OXYGEN SATURATION: 96 % | WEIGHT: 226 LBS | TEMPERATURE: 97.4 F | SYSTOLIC BLOOD PRESSURE: 130 MMHG | BODY MASS INDEX: 32.35 KG/M2 | HEIGHT: 70 IN | HEART RATE: 53 BPM | DIASTOLIC BLOOD PRESSURE: 78 MMHG | RESPIRATION RATE: 16 BRPM

## 2025-04-09 DIAGNOSIS — I10 PRIMARY HYPERTENSION: Primary | Chronic | ICD-10-CM

## 2025-04-09 DIAGNOSIS — R79.89 ELEVATED LFTS: ICD-10-CM

## 2025-04-09 DIAGNOSIS — B97.7 HPV (HUMAN PAPILLOMA VIRUS) INFECTION: ICD-10-CM

## 2025-04-09 PROCEDURE — 99214 OFFICE O/P EST MOD 30 MIN: CPT | Performed by: INTERNAL MEDICINE

## 2025-04-09 RX ORDER — IMIQUIMOD 12.5 MG/.25G
1 CREAM TOPICAL 3 TIMES WEEKLY
Qty: 28 EACH | Refills: 1 | Status: SHIPPED | OUTPATIENT
Start: 2025-04-09

## 2025-04-09 NOTE — PROGRESS NOTES
"Chief Complaint  Follow-up and Hypertension    Subjective        Evan Carballo presents to [facility]  Hypertension      Denies any significant headache, shortness of breath or chest pain.  No visual changes.  Taking medication without complication.   Mild liver function elevation and repeat was essentially the same.  Also has history of HPV and feels like he has a new place he was interested in treating  Objective   Vital Signs:  /78 (BP Location: Left arm, Patient Position: Sitting, Cuff Size: Large Adult)   Pulse 53   Temp 97.4 °F (36.3 °C) (Temporal)   Resp 16   Ht 177.8 cm (70\")   Wt 103 kg (226 lb)   SpO2 96%   BMI 32.43 kg/m²   Estimated body mass index is 32.43 kg/m² as calculated from the following:    Height as of this encounter: 177.8 cm (70\").    Weight as of this encounter: 103 kg (226 lb).            Physical Exam 4 mm verrucous lesion on the left shaft of the penis  Result Review :         Cryotherapy, Skin Lesion    Date/Time: 4/9/2025 1:07 PM    Performed by: Jonathan Swenson MD (Tony)  Authorized by: Jonathan Swenson MD (Tony)  Preparation: Patient was prepped and draped in the usual sterile fashion.  Local anesthesia used: no    Anesthesia:  Local anesthesia used: no    Sedation:  Patient sedated: no    Patient tolerance: patient tolerated the procedure well with no immediate complications            Assessment and Plan   Diagnoses and all orders for this visit:    1. Primary hypertension (Primary)    2. Elevated LFTs    3. HPV (human papilloma virus) infection    Other orders  -     imiquimod (Aldara) 5 % cream; Apply 1 Application topically to the appropriate area as directed 3 (Three) Times a Week.  Dispense: 28 each; Refill: 1    CHILDRESS and mild liver function elevation.  He is working on exercise and diet and weight reduction.  Will recheck with his physical in the fall.  HPV.  Liquid nitrogen application and 3 times a " week Aldara cream use sparingly and cautiously         Follow Up   No follow-ups on file.  Patient was given instructions and counseling regarding his condition or for health maintenance advice. Please see specific information pulled into the AVS if appropriate.

## 2025-06-10 ENCOUNTER — OFFICE VISIT (OUTPATIENT)
Dept: INTERNAL MEDICINE | Facility: CLINIC | Age: 60
End: 2025-06-10
Payer: COMMERCIAL

## 2025-06-10 VITALS
HEART RATE: 59 BPM | SYSTOLIC BLOOD PRESSURE: 124 MMHG | WEIGHT: 229.8 LBS | DIASTOLIC BLOOD PRESSURE: 82 MMHG | BODY MASS INDEX: 32.9 KG/M2 | OXYGEN SATURATION: 95 % | HEIGHT: 70 IN

## 2025-06-10 DIAGNOSIS — K42.9 UMBILICAL HERNIA WITHOUT OBSTRUCTION AND WITHOUT GANGRENE: Primary | ICD-10-CM

## 2025-06-10 PROCEDURE — 99213 OFFICE O/P EST LOW 20 MIN: CPT | Performed by: INTERNAL MEDICINE

## 2025-06-10 NOTE — PROGRESS NOTES
"Answers submitted by the patient for this visit:  Abdominal Pain Questionnaire (Submitted on 6/9/2025)  Chief Complaint: Abdominal pain  Chronicity: chronic  Onset: more than 1 month ago  Onset quality: gradual  Frequency: intermittently  Progression since onset: unchanged  When you have abdominal pain, how long does it last?: days  Pain location: generalized abdominal region, periumbilical region  Pain - numeric: 6/10  Pain quality: aching  Radiates to: periumbilical region  anorexia: No  arthralgias: No  belching: No  constipation: No  diarrhea: No  dysuria: No  fever: No  flatus: Yes  frequency: No  hematochezia: No  hematuria: No  melena: No  myalgias: No  nausea: No  weight loss: No  vomiting: No  Aggravated by: palpation  Relieved by: nothing  Chief Complaint  Follow-up (Possible Hernia)    Subjective        Evan Carballo presents to [facility]  History of Present Illness  He has had this history of umbilical hernia for years but over the past couple months has gotten more sore if he does a lot of heavy lifting or activity.  No changes in bowel habits.  No constipation.  Objective   Vital Signs:  /82 (BP Location: Left arm, Patient Position: Sitting, Cuff Size: Adult)   Pulse 59   Ht 177.8 cm (70\")   Wt 104 kg (229 lb 12.8 oz)   SpO2 95%   BMI 32.97 kg/m²   Estimated body mass index is 32.97 kg/m² as calculated from the following:    Height as of this encounter: 177.8 cm (70\").    Weight as of this encounter: 104 kg (229 lb 12.8 oz).            Physical Exam  Abdominal:      General: There is no distension.      Palpations: There is no mass.      Tenderness: There is no abdominal tenderness. There is no guarding.      Hernia: A hernia is present.        Result Review :                Assessment and Plan   Diagnoses and all orders for this visit:    1. Umbilical hernia without obstruction and without gangrene (Primary)  -     Ambulatory Referral to " General Surgery    Umbilical hernia.  He does have quite a bit of pain with it.  No obvious incarceration by exam.  Surgical referral.  We talked about CT scan but we will hold off on that as is not bothering as much now.  Defer that if surgery feels like he needs one preoperatively.         Follow Up   No follow-ups on file.  Patient was given instructions and counseling regarding his condition or for health maintenance advice. Please see specific information pulled into the AVS if appropriate.

## 2025-07-15 ENCOUNTER — OFFICE VISIT (OUTPATIENT)
Dept: SURGERY | Facility: CLINIC | Age: 60
End: 2025-07-15
Payer: COMMERCIAL

## 2025-07-15 VITALS
BODY MASS INDEX: 32.56 KG/M2 | DIASTOLIC BLOOD PRESSURE: 82 MMHG | WEIGHT: 227.4 LBS | HEIGHT: 70 IN | OXYGEN SATURATION: 96 % | HEART RATE: 58 BPM | SYSTOLIC BLOOD PRESSURE: 122 MMHG

## 2025-07-15 DIAGNOSIS — K42.9 UMBILICAL HERNIA WITHOUT OBSTRUCTION AND WITHOUT GANGRENE: Primary | ICD-10-CM

## 2025-07-15 PROCEDURE — 99203 OFFICE O/P NEW LOW 30 MIN: CPT | Performed by: SURGERY

## 2025-07-15 NOTE — LETTER
July 15, 2025     Jonathan Swenson MD (Tony)     Patient: Evan Carballo   YOB: 1965   Date of Visit: 7/15/2025     Dear Jonathan Swenson MD (Tony):       Thank you for referring Evan Carballo to me for evaluation. Below are the relevant portions of my assessment and plan of care.    If you have questions, please do not hesitate to call me. I look forward to following Evan along with you.         Sincerely,        Jonathan Galvan Jr., MD        CC: No Recipients    Jonathan Galvan Jr., MD  07/15/25 1743  Sign when Signing Visit  Subjective  Evan Carballo is a 60 y.o. male presents the office in surgical consultation from Jonathan Swenson MD (Tony) for an umbilical hernia.    History of Present Illness     The patient has a longstanding bulge at the umbilicus that was asymptomatic until recently.  He was performing physical activity and developed pain at the umbilicus.  He has noticed that the bulge appears larger and this is the source of the pain.  He has not had any change in his bowel or bladder function.  He has never had abdominal surgery.    Review of Systems   Constitutional:  Negative for chills and fever.   Gastrointestinal:  Negative for abdominal distention, abdominal pain, constipation, diarrhea, nausea and vomiting.     Past Medical History:   Diagnosis Date   • Cataract 2022   • Gout    • HL (hearing loss) 2010   • Hypertension 2000     Past Surgical History:   Procedure Laterality Date   • BRAIN SURGERY  2022   • COLONOSCOPY  2015   • EYE SURGERY  March 2024    cataract   • FOOT SURGERY     • TONSILLECTOMY  1970     Family History   Problem Relation Age of Onset   • Hypertension Father    • Heart disease Father      Social History     Socioeconomic History   • Marital status:    Tobacco Use   • Smoking status: Never   • Smokeless tobacco: Never   Vaping Use   • Vaping status: Never Used   Substance and Sexual Activity   • Alcohol use: No   • Drug use: Never   •  Sexual activity: Yes     Partners: Female     Birth control/protection: None       Objective  Physical Exam  Constitutional:       Appearance: He is not ill-appearing or toxic-appearing.   Abdominal:      Palpations: Abdomen is soft.      Tenderness: There is no abdominal tenderness.      Hernia: A hernia is present. Hernia is present in the umbilical area.      Comments: There is a small umbilical hernia that contains preperitoneal fat.  It is incarcerated and it is tender to palpation.   Neurological:      Mental Status: He is alert.   Psychiatric:         Behavior: Behavior is cooperative.              Assessment & Plan    1.  Umbilical hernia: The patient has a symptomatic umbilical hernia that is small with a defect too small to permit the small bowel.  He experiences pain with certain activities and it is tender on palpation.  The situation was discussed with the patient.  It was explained that he does not have a dangerous hernia and that the small bowel is currently not at risk of becoming incarcerated because the defect is too small for the.  The presence of pain is an indication to proceed with umbilical hernia repair.  He states that he is interested in having the hernia repaired but he wants to wait.  He will contact our office when he decides to schedule an umbilical hernia repair.

## 2025-07-15 NOTE — PROGRESS NOTES
Subjective   Evan Carballo is a 60 y.o. male presents the office in surgical consultation from Jonathan Swenson MD (Tony) for an umbilical hernia.    History of Present Illness     The patient has a longstanding bulge at the umbilicus that was asymptomatic until recently.  He was performing physical activity and developed pain at the umbilicus.  He has noticed that the bulge appears larger and this is the source of the pain.  He has not had any change in his bowel or bladder function.  He has never had abdominal surgery.    Review of Systems   Constitutional:  Negative for chills and fever.   Gastrointestinal:  Negative for abdominal distention, abdominal pain, constipation, diarrhea, nausea and vomiting.     Past Medical History:   Diagnosis Date    Cataract 2022    Gout     HL (hearing loss) 2010    Hypertension 2000     Past Surgical History:   Procedure Laterality Date    BRAIN SURGERY  2022    COLONOSCOPY  2015    EYE SURGERY  March 2024    cataract    FOOT SURGERY      TONSILLECTOMY  1970     Family History   Problem Relation Age of Onset    Hypertension Father     Heart disease Father      Social History     Socioeconomic History    Marital status:    Tobacco Use    Smoking status: Never    Smokeless tobacco: Never   Vaping Use    Vaping status: Never Used   Substance and Sexual Activity    Alcohol use: No    Drug use: Never    Sexual activity: Yes     Partners: Female     Birth control/protection: None       Objective   Physical Exam  Constitutional:       Appearance: He is not ill-appearing or toxic-appearing.   Abdominal:      Palpations: Abdomen is soft.      Tenderness: There is no abdominal tenderness.      Hernia: A hernia is present. Hernia is present in the umbilical area.      Comments: There is a small umbilical hernia that contains preperitoneal fat.  It is incarcerated and it is tender to palpation.   Neurological:      Mental Status: He is alert.   Psychiatric:         Behavior:  Behavior is cooperative.              Assessment & Plan     1.  Umbilical hernia: The patient has a symptomatic umbilical hernia that is small with a defect too small to permit the small bowel.  He experiences pain with certain activities and it is tender on palpation.  The situation was discussed with the patient.  It was explained that he does not have a dangerous hernia and that the small bowel is currently not at risk of becoming incarcerated because the defect is too small for the.  The presence of pain is an indication to proceed with umbilical hernia repair.  He states that he is interested in having the hernia repaired but he wants to wait.  He will contact our office when he decides to schedule an umbilical hernia repair.

## 2025-08-28 ENCOUNTER — TELEPHONE (OUTPATIENT)
Dept: INTERNAL MEDICINE | Facility: CLINIC | Age: 60
End: 2025-08-28
Payer: COMMERCIAL

## 2025-08-28 DIAGNOSIS — R79.89 ELEVATED LFTS: Primary | ICD-10-CM
